# Patient Record
Sex: MALE | Employment: FULL TIME | URBAN - METROPOLITAN AREA
[De-identification: names, ages, dates, MRNs, and addresses within clinical notes are randomized per-mention and may not be internally consistent; named-entity substitution may affect disease eponyms.]

---

## 2018-11-02 ENCOUNTER — HOSPITAL ENCOUNTER (INPATIENT)
Age: 63
LOS: 1 days | Discharge: HOME | DRG: 916 | End: 2018-11-03
Attending: EMERGENCY MEDICINE | Admitting: FAMILY MEDICINE
Payer: COMMERCIAL

## 2018-11-02 DIAGNOSIS — R55 SYNCOPE, UNSPECIFIED SYNCOPE TYPE: Primary | ICD-10-CM

## 2018-11-02 DIAGNOSIS — T78.40XA ALLERGIC REACTION, INITIAL ENCOUNTER: ICD-10-CM

## 2018-11-02 PROBLEM — T78.2XXA ANAPHYLAXIS: Status: ACTIVE | Noted: 2018-11-02

## 2018-11-02 PROBLEM — R00.1 BRADYCARDIA: Status: ACTIVE | Noted: 2018-11-02

## 2018-11-02 PROBLEM — E87.20 LACTIC ACID ACIDOSIS: Status: ACTIVE | Noted: 2018-11-02

## 2018-11-02 LAB
ALBUMIN SERPL-MCNC: 3.5 G/DL (ref 3.5–5)
ALBUMIN/GLOB SERPL: 1.1 {RATIO} (ref 1.1–2.2)
ALP SERPL-CCNC: 82 U/L (ref 45–117)
ALT SERPL-CCNC: 30 U/L (ref 12–78)
ANION GAP SERPL CALC-SCNC: 12 MMOL/L (ref 5–15)
ANION GAP SERPL CALC-SCNC: 7 MMOL/L (ref 5–15)
AST SERPL-CCNC: 29 U/L (ref 15–37)
ATRIAL RATE: 68 BPM
BASOPHILS # BLD: 0 K/UL (ref 0–0.1)
BASOPHILS NFR BLD: 0 % (ref 0–1)
BILIRUB SERPL-MCNC: 0.5 MG/DL (ref 0.2–1)
BUN SERPL-MCNC: 14 MG/DL (ref 6–20)
BUN SERPL-MCNC: 16 MG/DL (ref 6–20)
BUN/CREAT SERPL: 12 (ref 12–20)
BUN/CREAT SERPL: 14 (ref 12–20)
CALCIUM SERPL-MCNC: 8.4 MG/DL (ref 8.5–10.1)
CALCIUM SERPL-MCNC: 8.8 MG/DL (ref 8.5–10.1)
CALCULATED P AXIS, ECG09: 31 DEGREES
CALCULATED R AXIS, ECG10: 54 DEGREES
CALCULATED T AXIS, ECG11: 40 DEGREES
CHLORIDE SERPL-SCNC: 105 MMOL/L (ref 97–108)
CHLORIDE SERPL-SCNC: 106 MMOL/L (ref 97–108)
CO2 SERPL-SCNC: 22 MMOL/L (ref 21–32)
CO2 SERPL-SCNC: 26 MMOL/L (ref 21–32)
COMMENT, HOLDF: NORMAL
CREAT SERPL-MCNC: 1.15 MG/DL (ref 0.7–1.3)
CREAT SERPL-MCNC: 1.2 MG/DL (ref 0.7–1.3)
DIAGNOSIS, 93000: NORMAL
DIFFERENTIAL METHOD BLD: ABNORMAL
EOSINOPHIL # BLD: 0.1 K/UL (ref 0–0.4)
EOSINOPHIL NFR BLD: 0 % (ref 0–7)
ERYTHROCYTE [DISTWIDTH] IN BLOOD BY AUTOMATED COUNT: 13.1 % (ref 11.5–14.5)
GLOBULIN SER CALC-MCNC: 3.3 G/DL (ref 2–4)
GLUCOSE SERPL-MCNC: 123 MG/DL (ref 65–100)
GLUCOSE SERPL-MCNC: 158 MG/DL (ref 65–100)
HCT VFR BLD AUTO: 46.2 % (ref 36.6–50.3)
HGB BLD-MCNC: 15.9 G/DL (ref 12.1–17)
LACTATE BLD-SCNC: 2.01 MMOL/L (ref 0.4–2)
LACTATE SERPL-SCNC: 2.7 MMOL/L (ref 0.4–2)
LACTATE SERPL-SCNC: 3.5 MMOL/L (ref 0.4–2)
LACTATE SERPL-SCNC: 3.7 MMOL/L (ref 0.4–2)
LYMPHOCYTES # BLD: 1.6 K/UL
LYMPHOCYTES NFR BLD: 10 % (ref 12–49)
MCH RBC QN AUTO: 30.7 PG (ref 26–34)
MCHC RBC AUTO-ENTMCNC: 34.4 G/DL (ref 30–36.5)
MCV RBC AUTO: 89.2 FL (ref 80–99)
MONOCYTES # BLD: 0.5 K/UL (ref 0–1)
MONOCYTES NFR BLD: 3 % (ref 5–13)
NEUTS SEG # BLD: 12.9 K/UL (ref 1.8–8)
NEUTS SEG NFR BLD: 87 % (ref 32–75)
P-R INTERVAL, ECG05: 160 MS
PLATELET # BLD AUTO: 168 K/UL (ref 150–400)
PMV BLD AUTO: 11 FL (ref 8.9–12.9)
POTASSIUM SERPL-SCNC: 3.9 MMOL/L (ref 3.5–5.1)
POTASSIUM SERPL-SCNC: 4.2 MMOL/L (ref 3.5–5.1)
PROT SERPL-MCNC: 6.8 G/DL (ref 6.4–8.2)
Q-T INTERVAL, ECG07: 396 MS
QRS DURATION, ECG06: 98 MS
QTC CALCULATION (BEZET), ECG08: 421 MS
RBC # BLD AUTO: 5.18 M/UL (ref 4.1–5.7)
SAMPLES BEING HELD,HOLD: NORMAL
SODIUM SERPL-SCNC: 139 MMOL/L (ref 136–145)
SODIUM SERPL-SCNC: 139 MMOL/L (ref 136–145)
TROPONIN I SERPL-MCNC: <0.05 NG/ML
VENTRICULAR RATE, ECG03: 68 BPM
WBC # BLD AUTO: 15 K/UL (ref 4.1–11.1)
XXWBCSUS: 0

## 2018-11-02 PROCEDURE — 74011636637 HC RX REV CODE- 636/637: Performed by: EMERGENCY MEDICINE

## 2018-11-02 PROCEDURE — 85025 COMPLETE CBC W/AUTO DIFF WBC: CPT | Performed by: EMERGENCY MEDICINE

## 2018-11-02 PROCEDURE — 83605 ASSAY OF LACTIC ACID: CPT | Performed by: FAMILY MEDICINE

## 2018-11-02 PROCEDURE — 83605 ASSAY OF LACTIC ACID: CPT

## 2018-11-02 PROCEDURE — 87040 BLOOD CULTURE FOR BACTERIA: CPT | Performed by: EMERGENCY MEDICINE

## 2018-11-02 PROCEDURE — 74011250636 HC RX REV CODE- 250/636: Performed by: FAMILY MEDICINE

## 2018-11-02 PROCEDURE — 74011250636 HC RX REV CODE- 250/636

## 2018-11-02 PROCEDURE — 36415 COLL VENOUS BLD VENIPUNCTURE: CPT | Performed by: FAMILY MEDICINE

## 2018-11-02 PROCEDURE — 80053 COMPREHEN METABOLIC PANEL: CPT | Performed by: EMERGENCY MEDICINE

## 2018-11-02 PROCEDURE — 84484 ASSAY OF TROPONIN QUANT: CPT | Performed by: EMERGENCY MEDICINE

## 2018-11-02 PROCEDURE — 74011250636 HC RX REV CODE- 250/636: Performed by: EMERGENCY MEDICINE

## 2018-11-02 PROCEDURE — 65660000000 HC RM CCU STEPDOWN

## 2018-11-02 PROCEDURE — 96374 THER/PROPH/DIAG INJ IV PUSH: CPT

## 2018-11-02 PROCEDURE — 96375 TX/PRO/DX INJ NEW DRUG ADDON: CPT

## 2018-11-02 PROCEDURE — 99285 EMERGENCY DEPT VISIT HI MDM: CPT

## 2018-11-02 PROCEDURE — 74011250636 HC RX REV CODE- 250/636: Performed by: STUDENT IN AN ORGANIZED HEALTH CARE EDUCATION/TRAINING PROGRAM

## 2018-11-02 PROCEDURE — 93005 ELECTROCARDIOGRAM TRACING: CPT

## 2018-11-02 RX ORDER — PREDNISONE 20 MG/1
TABLET ORAL
Qty: 10 TAB | Refills: 0 | Status: SHIPPED | OUTPATIENT
Start: 2018-11-03 | End: 2018-11-03 | Stop reason: SDUPTHER

## 2018-11-02 RX ORDER — PREDNISONE 20 MG/1
60 TABLET ORAL
Status: COMPLETED | OUTPATIENT
Start: 2018-11-02 | End: 2018-11-02

## 2018-11-02 RX ORDER — SODIUM CHLORIDE 0.9 % (FLUSH) 0.9 %
5-10 SYRINGE (ML) INJECTION EVERY 8 HOURS
Status: DISCONTINUED | OUTPATIENT
Start: 2018-11-02 | End: 2018-11-03 | Stop reason: HOSPADM

## 2018-11-02 RX ORDER — SODIUM CHLORIDE 0.9 % (FLUSH) 0.9 %
5-10 SYRINGE (ML) INJECTION AS NEEDED
Status: DISCONTINUED | OUTPATIENT
Start: 2018-11-02 | End: 2018-11-03 | Stop reason: HOSPADM

## 2018-11-02 RX ORDER — SODIUM CHLORIDE 9 MG/ML
125 INJECTION, SOLUTION INTRAVENOUS CONTINUOUS
Status: DISCONTINUED | OUTPATIENT
Start: 2018-11-02 | End: 2018-11-03 | Stop reason: HOSPADM

## 2018-11-02 RX ORDER — HEPARIN SODIUM 5000 [USP'U]/ML
5000 INJECTION, SOLUTION INTRAVENOUS; SUBCUTANEOUS EVERY 8 HOURS
Status: DISCONTINUED | OUTPATIENT
Start: 2018-11-02 | End: 2018-11-03 | Stop reason: HOSPADM

## 2018-11-02 RX ORDER — SODIUM CHLORIDE 9 MG/ML
500 INJECTION, SOLUTION INTRAVENOUS ONCE
Status: COMPLETED | OUTPATIENT
Start: 2018-11-03 | End: 2018-11-03

## 2018-11-02 RX ORDER — DIPHENHYDRAMINE HYDROCHLORIDE 50 MG/ML
25 INJECTION, SOLUTION INTRAMUSCULAR; INTRAVENOUS ONCE
Status: COMPLETED | OUTPATIENT
Start: 2018-11-02 | End: 2018-11-02

## 2018-11-02 RX ORDER — FAMOTIDINE 10 MG/ML
20 INJECTION INTRAVENOUS
Status: COMPLETED | OUTPATIENT
Start: 2018-11-02 | End: 2018-11-02

## 2018-11-02 RX ORDER — ATROPINE SULFATE 0.1 MG/ML
0.5 INJECTION INTRAVENOUS ONCE
Status: COMPLETED | OUTPATIENT
Start: 2018-11-02 | End: 2018-11-02

## 2018-11-02 RX ORDER — ASPIRIN 500 MG
1000 TABLET, DELAYED RELEASE (ENTERIC COATED) ORAL
COMMUNITY
End: 2019-07-15

## 2018-11-02 RX ADMIN — ATROPINE SULFATE 0.5 MG: 0.1 INJECTION, SOLUTION ENDOTRACHEAL; INTRAMUSCULAR; INTRAVENOUS; SUBCUTANEOUS at 05:56

## 2018-11-02 RX ADMIN — FAMOTIDINE 20 MG: 10 INJECTION, SOLUTION INTRAVENOUS at 05:34

## 2018-11-02 RX ADMIN — SODIUM CHLORIDE 1000 ML: 900 INJECTION, SOLUTION INTRAVENOUS at 13:44

## 2018-11-02 RX ADMIN — HEPARIN SODIUM 5000 UNITS: 5000 INJECTION, SOLUTION INTRAVENOUS; SUBCUTANEOUS at 22:42

## 2018-11-02 RX ADMIN — DIPHENHYDRAMINE HYDROCHLORIDE 25 MG: 50 INJECTION, SOLUTION INTRAMUSCULAR; INTRAVENOUS at 05:31

## 2018-11-02 RX ADMIN — SODIUM CHLORIDE 1000 ML: 900 INJECTION, SOLUTION INTRAVENOUS at 06:00

## 2018-11-02 RX ADMIN — SODIUM CHLORIDE 1000 ML: 900 INJECTION, SOLUTION INTRAVENOUS at 20:57

## 2018-11-02 RX ADMIN — SODIUM CHLORIDE 125 ML/HR: 900 INJECTION, SOLUTION INTRAVENOUS at 08:48

## 2018-11-02 RX ADMIN — PREDNISONE 60 MG: 20 TABLET ORAL at 05:29

## 2018-11-02 RX ADMIN — METHYLPREDNISOLONE SODIUM SUCCINATE 125 MG: 125 INJECTION, POWDER, FOR SOLUTION INTRAMUSCULAR; INTRAVENOUS at 07:27

## 2018-11-02 RX ADMIN — Medication 5 ML: at 14:00

## 2018-11-02 RX ADMIN — SODIUM CHLORIDE 125 ML/HR: 900 INJECTION, SOLUTION INTRAVENOUS at 19:00

## 2018-11-02 NOTE — ED NOTES
Dr Pako Mendoza at bedside aware of heart rate in 40s and BP 81/37. NS bolus started and Atropine given per orders.

## 2018-11-02 NOTE — H&P
[address] [place] [address]   Office [phone]  Fax [phone]       Admission H&P     Name: Dina Smith MRN: [id number]  Sex: Male   YOB: 1955  Age: 61 y.o. PCP: Carlos Catherine MD     Source of Information: patient, medical records    Chief complaint: allergic reaction and passing out    History of Present Illness  Dina Smith is a 61 y.o. male with hx of 1 prior allergic reaction in the past who presented to the [facility] ER by EMS for allergic reaction, syncope, and urticaria. The patient reports that yesterday evening he ate a hot dog around 17:30. Later in the evening around 11:00, he started to develop itching that got progressively worse. He reports that at 12:30 am he took 2 benadryl and 2 aspirin, but this did not improve his symtpoms. He then having an unwitnessed episode of syncope as he walking to the bathroom. He reports calling out to his wife prior to passing out and feeling faint, but does not remember any thing else until being woken up by his wife. He wife reports that he heard him call out and fall, and she immediatly responded. She found him slumped over with his back and side toward the wall. She does not believe that he hit his head on the fall because he was sitting against the wall when she found him. EMS was called and his BP was too low to be read according to his wife. He was given 500 cc NS bolus, and brought to the [facility] ED. At the [facility] ED, his BP was found to be 15/39 systolic. Labs were obtained, which showed: CBC wnl, BMP s/f Cr of 1.15 (b/l  0.75). Troponin was negative. EKG showed NSR with occasional PVCs. He was given 1L NS bolus. He was also given solumedrol 125mg x1, atropine 0.5mg x1, pepcid 20mg x1, benadryl 25mg once, and Prednisone 60mg. He was started on 125ml NS, and he was reffered for admission for further management of his allergic reaction and hypotension.      On evaluation the patient reports that he is feeling better. He denies any dyspnea, CP, n/v, f/c, dizziness, vision changes, or other concerning symptoms. He reports that his pruritis is improving. He denies any tightness of the throat or mouth. He denies any swelling sensation of the tongue, lips, or throat. He denies any chest tightness or difficulty breathing. The patient reports that has a history of suspected meat allergy 2 years ago for which he went to the ED. At that time, he was prescribed an EPiPen, but it was never filled. He denies having followed up with an allergist of having any specific allergy testing. He reports that he has eaten hot dogs since the last episode without any symptoms. Past Medical History:   Diagnosis Date    Contact dermatitis and other eczema, due to unspecified cause       Patient Vitals for the past 12 hrs:   Temp Pulse Resp BP SpO2   11/02/18 1000 -- 92 17 121/75 93 %   11/02/18 0945 -- 87 19 120/74 92 %   11/02/18 0930 98.6 °F (37 °C) 98 27 108/84 94 %   11/02/18 0715 98.4 °F (36.9 °C) 79 16 140/77 95 %   11/02/18 0700 -- 82 15 124/65 96 %   11/02/18 0630 -- 79 13 132/67 98 %   11/02/18 0626 97.6 °F (36.4 °C) 81 16 121/69 100 %   11/02/18 0605 -- 69 13 103/65 96 %   11/02/18 0556 -- (!) 53 -- (!) 86/62 --   11/02/18 0548 -- (!) 56 17 (!) 86/62 95 %   11/02/18 0545 -- (!) 56 20 (!) 81/37 97 %   11/02/18 0530 -- 74 18 112/77 97 %   11/02/18 0521 -- 72 16 107/64 96 %   11/02/18 0500 97.7 °F (36.5 °C) 66 18 120/73 100 %       Home Medications   Prior to Admission medications    Medication Sig Start Date End Date Taking? Authorizing Provider   aspirin  mg tablet Take 1,000 mg by mouth every six (6) hours as needed for Pain. Yes Other, MD Ammon   diphenhydrAMINE (BENADRYL) 25 mg capsule Take 50 mg by mouth every six (6) hours as needed. Yes Other, MD Ammon   EPINEPHrine (EPIPEN) 0.3 mg/0.3 mL injection 0.3 mL by IntraMUSCular route once as needed for up to 1 dose.  9/3/16   Perri Lomeli MD Allergies  Allergies   Allergen Reactions    Beef Containing Products Hives     Grass feed beef or hops       Past Medical History:   Diagnosis Date    Contact dermatitis and other eczema, due to unspecified cause          History reviewed. No pertinent surgical history. Family History   Problem Relation Age of Onset    Hypertension Mother     Stroke Mother     Psychiatric Disorder Father         dementia    Hypertension Sister        Social History  Social History     Socioeconomic History    Marital status:      Spouse name: Not on file    Number of children: Not on file    Years of education: Not on file    Highest education level: Not on file   Social Needs    Financial resource strain: Not on file    Food insecurity - worry: Not on file    Food insecurity - inability: Not on file   [employer] needs - medical: Not on file   [employer] needs - non-medical: Not on file   Occupational History    Not on file   Tobacco Use    Smoking status: Never Smoker    Smokeless tobacco: Never Used   Substance and Sexual Activity    Alcohol use: Yes     Comment: occasional    Drug use: No    Sexual activity: Yes     Partners: Female   Other Topics Concern    Not on file   Social History Narrative    Not on file       Alcohol history: Rarely  Smoking history: Non-smoker  Illicit drug history: Not at all    Living arrangement: patient lives with his spouse. Ambulates: Independently     Review of Systems  Review of Systems   Constitutional: Negative for appetite change. Negative for activity change, chills, diaphoresis and fatigue. HENT: Negative for congestion and dental problem. Eyes: Negative for discharge. Respiratory: Negative for apnea and chest tightness. Cardiovascular: Negative for chest pain and leg swelling. Gastrointestinal: Negative for abdominal distention and abdominal pain. Genitourinary: Negative for difficulty urinating and dysuria.    Musculoskeletal: Negative for arthralgias and back pain. Skin: see HPI  Neurological: see HPI  Hematological: Negative for adenopathy. Psychiatric/Behavioral: Negative for agitation. The patient is not nervous/anxious. Physical Exam  Physical Exam   Constitutional: well-developed and well-nourished. HENT:   Head: Normocephalic and atraumatic. Eyes: Conjunctivae are normal. Pupils are equal, round, and reactive to light. Throat: no swelling of tongue, lips, or posterior oropharynx  Neck: Normal range of motion. Neck supple. No JVD present. No tracheal deviation present. No thyromegaly present. Cardiovascular: Normal rate, regular rhythm and normal heart sounds. Exam reveals no friction rub. No murmur heard. Pulmonary/Chest: Effort normal and breath sounds normal. No stridor. No respiratory distress. no wheezes. no rales. no tenderness. Abdominal: Soft. Bowel sounds are normal. no distension and no mass. no tenderness. There is no rebound and no guarding. Musculoskeletal: Normal range of motion. no edema, tenderness or deformity. Lymphadenopathy:    no cervical adenopathy. Neurological: No cranial nerve deficit. Coordination normal.   Skin: scattered areas of slightly raised, erythematous rashes along the inferior trunk and back  Psychiatric: normal mood and affect. O2 Device: Room air     Laboratory Data  Recent Results (from the past 8 hour(s))   CBC WITH AUTOMATED DIFF    Collection Time: 11/02/18  5:10 AM   Result Value Ref Range    WBC 15.0 (H) 4.1 - 11.1 K/uL    RBC 5.18 4.10 - 5.70 M/uL    HGB 15.9 12.1 - 17.0 g/dL    HCT 46.2 36.6 - 50.3 %    MCV 89.2 80.0 - 99.0 FL    MCH 30.7 26.0 - 34.0 PG    MCHC 34.4 30.0 - 36.5 g/dL    RDW 13.1 11.5 - 14.5 %    PLATELET 475 559 - 331 K/uL    MPV 11.0 8.9 - 12.9 FL    NEUTROPHILS 87 (H) 32 - 75 %    LYMPHOCYTES 10 (L) 12 - 49 %    MONOCYTES 3 (L) 5 - 13 %    EOSINOPHILS 0 0 - 7 %    BASOPHILS 0 0 - 1 %    ABS.  NEUTROPHILS 12.9 (H) 1.8 - 8.0 K/UL    ABS. LYMPHOCYTES 1.6 K/UL    ABS. MONOCYTES 0.5 0.0 - 1.0 K/UL    ABS. EOSINOPHILS 0.1 0.0 - 0.4 K/UL    ABS. BASOPHILS 0.0 0.0 - 0.1 K/UL    DF AUTOMATED      XXWBCSUS 0     METABOLIC PANEL, COMPREHENSIVE    Collection Time: 11/02/18  5:10 AM   Result Value Ref Range    Sodium 139 136 - 145 mmol/L    Potassium 4.2 3.5 - 5.1 mmol/L    Chloride 106 97 - 108 mmol/L    CO2 26 21 - 32 mmol/L    Anion gap 7 5 - 15 mmol/L    Glucose 123 (H) 65 - 100 mg/dL    BUN 16 6 - 20 MG/DL    Creatinine 1.15 0.70 - 1.30 MG/DL    BUN/Creatinine ratio 14 12 - 20      GFR est AA >60 >60 ml/min/1.73m2    GFR est non-AA >60 >60 ml/min/1.73m2    Calcium 8.8 8.5 - 10.1 MG/DL    Bilirubin, total 0.5 0.2 - 1.0 MG/DL    ALT (SGPT) 30 12 - 78 U/L    AST (SGOT) 29 15 - 37 U/L    Alk. phosphatase 82 45 - 117 U/L    Protein, total 6.8 6.4 - 8.2 g/dL    Albumin 3.5 3.5 - 5.0 g/dL    Globulin 3.3 2.0 - 4.0 g/dL    A-G Ratio 1.1 1.1 - 2.2     TROPONIN I    Collection Time: 11/02/18  5:10 AM   Result Value Ref Range    Troponin-I, Qt. <0.05 <0.05 ng/mL   SAMPLES BEING HELD    Collection Time: 11/02/18  5:10 AM   Result Value Ref Range    SAMPLES BEING HELD 1BLUE     COMMENT        Add-on orders for these samples will be processed based on acceptable specimen integrity and analyte stability, which may vary by analyte.    EKG, 12 LEAD, INITIAL    Collection Time: 11/02/18  5:20 AM   Result Value Ref Range    Ventricular Rate 76 BPM    Atrial Rate 76 BPM    P-R Interval 174 ms    QRS Duration 98 ms    Q-T Interval 406 ms    QTC Calculation (Bezet) 456 ms    Calculated P Axis 50 degrees    Calculated R Axis 52 degrees    Calculated T Axis 34 degrees    Diagnosis       Sinus rhythm with occasional and consecutive premature ventricular complexes  Abnormal ECG  No previous ECGs available     POC LACTIC ACID    Collection Time: 11/02/18  6:07 AM   Result Value Ref Range    Lactic Acid (POC) 2.01 (HH) 0.40 - 2.00 mmol/L         EKG: normal sinus rhythm, occasional PVC noted      Assessment and Plan     Ean Lopez is a 61 y.o. male who is admitted for allergic reaction and syncope 2/2 suspected beef allergy    Allergic Reaction: Urticaria, hypotension, and syncope 2/2 suspected beef allergy related to intake of hot dog on evening of 11/1/18. Patient reports having only one single episode of  allergic reaction in the past that included urticaria and throat swelling in the past 2 years ago. At that time he was given prescription for epinephrine injector, but it was not filled. He denies being evaluated by allergist in past. EMS provided 500cc NS bolus. In the ED, the patient has received  Solumedrol 125mg x 1, 1000cc NS bolus, atropine 0.5mg x 1, pepcid 20mg x 1, benadryl 25mg once, prednisone 60mg x 1. Presently the patient is asymptomatic and denies any breathing difficulties or itching.   -continue maintenance IVF  -Continue to monitor symptoms, breathing status, and vital signs.   -consider epinephrine, airway support, and other resuscitative measures as necessary  -recommend allergy work by PCP as outpatient  -recommend that patient avoid meats until he has allergy workup  -upon discharge will provide 5 day steroid burst  -ensure that patient has Epipen and instructions for use on discharge    Lactic Acidosis: LA was 2.01 in Ed  - continue IVF  -will recheck at 12:00 pm today    At Risk MARIANO: baseline Cr is 0.75, patient has cr today of 1.15  -continue IVF  -recheck BMP at 12:00 pm today          FEN/GI - Vegan Diet (d/t meat allergy). NS at 125 mL/hr. Activity - Out of bed with assistance  DVT prophylaxis - Sub Q Heparin  GI prophylaxis - Not indicated at this time  Fall prophylaxis - Not indicated at this time. Disposition - Admit to Stepdown. Plan to d/c to Home. Code Status - Full, discussed with patient / caregivers. Patient Ean Lopez will be discussed Dr. Ale Cotton.     10:39 AM, 11/02/18  Yessenia Donaldson MD  Family Medicine Resident       For Billing    Chief Complaint   Patient presents with   Sharon Heaton Allergic Reaction       Hospital Problems  Date Reviewed: 11/2/2018          Codes Class Noted POA    Bradycardia ICD-10-CM: R00.1  ICD-9-CM: 427.89  11/2/2018 Unknown        Syncope ICD-10-CM: R55  ICD-9-CM: 780.2  11/2/2018 Unknown        * (Principal) Allergic reaction ICD-10-CM: T78.40XA  ICD-9-CM: 995.3  11/2/2018 Unknown

## 2018-11-02 NOTE — ED PROVIDER NOTES
Nanda Abdullahi is a 60 yo M with history of allergy to beef who developed hives after eating a hotdog. He ate the hotdog around 9 pm and then a couple of hours later started to develop hives. He took 50 mg of benadryl around midnight and then another 25 mg an hour later. Then around 3:45am, he got up to go to the bathroom and passed out. His wife called [phone]. When EMS arrived. He was awake but had a HR in 40s and SBP in the 80s. EMS gave 500ml IVNS bolus and BP and HR improved. Patient denies cough, shortness or breath or throat irritation. Wife at bedside and states that shortly before 4am she heard her  call out to her. She found him in the hernandez sitting, leaning up against the wall. He was awake but slow to respond and was very sweaty. Past Medical History:   Diagnosis Date    Contact dermatitis and other eczema, due to unspecified cause        History reviewed. No pertinent surgical history.       Family History:   Problem Relation Age of Onset   [address] Hypertension Mother     Stroke Mother     Psychiatric Disorder Father         dementia    Hypertension Sister        Social History     Socioeconomic History    Marital status:      Spouse name: Not on file    Number of children: Not on file    Years of education: Not on file    Highest education level: Not on file   Social Needs    Financial resource strain: Not on file    Food insecurity - worry: Not on file    Food insecurity - inability: Not on file   [employer] needs - medical: Not on file   [employer] needs - non-medical: Not on file   Occupational History    Not on file   Tobacco Use    Smoking status: Never Smoker    Smokeless tobacco: Never Used   Substance and Sexual Activity    Alcohol use: Yes     Comment: occasional    Drug use: No    Sexual activity: Yes     Partners: Female   Other Topics Concern    Not on file   Social History Narrative    Not on file         ALLERGIES: Beef containing products    Review of Systems   Constitutional: Negative for fever. HENT: Negative for sore throat. Eyes: Negative for visual disturbance. Respiratory: Negative for cough and shortness of breath. Cardiovascular: Negative for chest pain. Gastrointestinal: Negative for abdominal pain. Genitourinary: Negative for dysuria. Musculoskeletal: Negative for back pain. Skin: Positive for rash. Neurological: Positive for syncope. Negative for headaches. Vitals:    11/02/18 0500   BP: 120/73   Pulse: 66   Resp: 18   Temp: 97.7 °F (36.5 °C)   SpO2: 100%   Weight: 106.6 kg (235 lb)   Height: 6' 3\" (1.905 m)            Physical Exam   Constitutional: He appears well-developed and well-nourished. No distress. HENT:   Head: Normocephalic and atraumatic. Mouth/Throat: Oropharynx is clear and moist. No posterior oropharyngeal edema. Eyes: Conjunctivae and EOM are normal.   Neck: Normal range of motion and phonation normal.   Cardiovascular: Normal rate and intact distal pulses. Pulmonary/Chest: Effort normal. No stridor. No respiratory distress. He has no wheezes. Abdominal: He exhibits no distension. Musculoskeletal: Normal range of motion. He exhibits no tenderness. Neurological: He is alert. He is not disoriented. He exhibits normal muscle tone. Skin: Skin is warm and dry. Rash noted. Rash is urticarial (trunk, arms and legs). Nursing note and vitals reviewed. ED EKG interpretation:  Rhythm: normal sinus rhythm; and regular . Rate (approx.): 68; Axis: normal; P wave: normal; QRS interval: normal ; ST/T wave: normal; Other findings: normal. This EKG was interpreted by Chula Drake MD,ED Provider. [facility]   Patient with syncopal episode after developing urticaria after eating hotdog. Known beef allergy. HR, BP and mentation normal in ED. Last benadryl at 1am.  Will give benadryl, famotidine and prednisone.     5:56 AM  Patient now feeling hot and asking to go to bathroom for BM. Patient diaphoretic, bradycardic. Suspect vasovagal reaction but bradycardia persists. 0.5mg atropine ordered. 6:26 AM  Discussed with family medicine, Dr. Granado Leader, patient will allergic reaction to ingesting meat, bradycardia and syncope. HR improved after atropine. Will admit.      Total critical care time spent exclusive of procedures:  35minutes    Procedures

## 2018-11-02 NOTE — ED NOTES
IV fluids infusing as ordered. Patient tolerating well. Timeout performed with [facility]. Crew verbalizes understanding of patient condition and destination of [facility] ICU 2. Patient in no distress, vital signs stable at time of transport. Transfer Assessment: Patient A&O x4 and in no distress. Physical re-examination reveals marked improvement in pts condition with reassessment of vital signs completed at the time of admission transfer and/or discharge.

## 2018-11-02 NOTE — PROGRESS NOTES
Reason for Admission:   Syncopal episode with bradycardia after developing urticaria after eating hotdog with known beef allergy                   RRAT Score:  0                   Plan for utilizing home health: There are no identified home health or hospital to home needs @ this time. Likelihood of Readmission: low/green                          Transition of Care Plan:                    I met with pt who confirmed he lives with his wife @ the address on demographics. Pt states he had a similar episode  after eating beef with his daughter. He was then diagnosed with a beef allergy He states he ate a \"Ronak \" hotdog in June but did not have any problems after eating that hotdog so did not anticipate problems with this hotdog. There are no identified home health needs or hospital to home needs. Pt states he was advised to go to an allergist after the first allergic reaction but he has never followed through. Pt states he never followed through with getting an epi-pen either. Attending states he nita prescribe an epi-pen when discharged. I notified Dr Héctor Martin & her nurse navigator that pt is hospitalized @ [facility],  From a case management perspective,pt has no identified discharge needs . Wife will transport pt home when discharged.     Escobar Berry

## 2018-11-02 NOTE — PROGRESS NOTES
@6752 TRANSFER - IN REPORT:    Verbal report received from [place] [facility] [place] [facility]\"") on Vinod Pear  being received from [facility] ED(unit) for routine progression of care      Report consisted of patients Situation, Background, Assessment and   Recommendations(SBAR). Information from the following report(s) SBAR, Kardex, ED Summary, Intake/Output, MAR, Accordion, Recent Results, Med Rec Status, Cardiac Rhythm Sinus and Alarm Parameters  was reviewed with the receiving nurse. Opportunity for questions and clarification was provided. Assessment completed upon patients arrival to unit and care assumed. @7524 Patient arrives to ICU 2 no signs of acute distress. Doctor Zuleyma Castro at bedside already. @1000 Dr. Josh Beard and team arrive to exam and admit patient. Plan of care is to obtain BMP and Lactic acid at noon. @1150 BMP and Lactic Acid Obtained. @5402 Family Practice updated to lactic acid results and will address.

## 2018-11-02 NOTE — ED NOTES
Patient medicated with ordered solumedrol IV; patient tolerated well. Spouse remains at bedside. Vital signs stable. Patient speaking full sentences and is in no respiratory distress. Call bell in reach.

## 2018-11-02 NOTE — ED TRIAGE NOTES
I ate a beef hotdog last night 1700 and have been having an itchy rash all over in spite of taking Benadryl  0340 passed out on the way to the bathroom  EMS found on floor with decreased level of consciousness and HR in the 40's and SBP in 80's  Alert and feeling better on arrival. Feeling less itchy but still having red splotchy rash

## 2018-11-02 NOTE — PROGRESS NOTES
1910 Bedside and Verbal shift change report received from Agnes Jeffries RN (offgoing nurse) by Wali Rowe RN (oncoming nurse). Report included the following information SBAR, Kardex, ED Summary, Procedure Summary, Intake/Output, MAR, Accordion, Recent Results, Cardiac Rhythm ST and Alarm Parameters . [address] resident notified of lactic result, order received. 0400 Am labs drawn and sent  0700 Bedside and Verbal shift change report given to Davy Catherine RN (oncoming nurse) by DEMARCUS Martinez RN (offgoing nurse). Report included the following information SBAR, Kardex, ED Summary, Intake/Output, MAR, Accordion, Recent Results, Cardiac Rhythm SR and Alarm Parameters .

## 2018-11-02 NOTE — ED NOTES
Assumed care at shift change while pt awaiting transport. Pt reexamined now and having no gi sx. Resting comfortably. No stridor or sob.  Mild hives to lower abd and hips unchanged since 7 am

## 2018-11-02 NOTE — ED NOTES
Pt responding well to medication administration. Alert and oriented. Skin warm and dry and now appropriate color for ethnicity. Reporting to be cold and is shaking. Vitals reassessed, temperature 97.7, provided with additional warm blankets.

## 2018-11-02 NOTE — ED NOTES
Assumed care of patient. Patient resting on stretcher in no distress; urticaria persists. Providers at bedside.

## 2018-11-02 NOTE — ED NOTES
TRANSFER - OUT REPORT:    Verbal report given to ROJAS Zaman(name) on Aleks Monzon  being transferred to [facility] ICU(unit) for routine progression of care       Report consisted of patients Situation, Background, Assessment and   Recommendations(SBAR). Information from the following report(s) SBAR, ED Summary, Intake/Output, MAR, Recent Results, Med Rec Status and Cardiac Rhythm NSR was reviewed with the receiving nurse. Lines:   Peripheral IV 11/02/18 Left Hand (Active)       Peripheral IV 11/02/18 Anterior Antecubital (Active)   Site Assessment Clean, dry, & intact 11/2/2018  6:09 AM   Phlebitis Assessment 0 11/2/2018  6:09 AM   Infiltration Assessment 0 11/2/2018  6:09 AM   Dressing Status Clean, dry, & intact 11/2/2018  6:09 AM   Dressing Type Transparent 11/2/2018  6:09 AM   Hub Color/Line Status Pink 11/2/2018  6:09 AM   Alcohol Cap Used Yes 11/2/2018  6:09 AM        Opportunity for questions and clarification was provided.       Patient transported with:   Monitor    Saline Lock

## 2018-11-03 VITALS
SYSTOLIC BLOOD PRESSURE: 115 MMHG | RESPIRATION RATE: 17 BRPM | WEIGHT: 251.1 LBS | HEART RATE: 73 BPM | DIASTOLIC BLOOD PRESSURE: 70 MMHG | TEMPERATURE: 98.1 F | BODY MASS INDEX: 31.22 KG/M2 | OXYGEN SATURATION: 98 % | HEIGHT: 75 IN

## 2018-11-03 LAB
ANION GAP SERPL CALC-SCNC: 7 MMOL/L (ref 5–15)
BACTERIA SPEC CULT: NORMAL
BACTERIA SPEC CULT: NORMAL
BASOPHILS # BLD: 0 K/UL (ref 0–0.1)
BASOPHILS NFR BLD: 0 % (ref 0–1)
BUN SERPL-MCNC: 11 MG/DL (ref 6–20)
BUN/CREAT SERPL: 12 (ref 12–20)
CALCIUM SERPL-MCNC: 7.9 MG/DL (ref 8.5–10.1)
CHLORIDE SERPL-SCNC: 109 MMOL/L (ref 97–108)
CO2 SERPL-SCNC: 25 MMOL/L (ref 21–32)
CREAT SERPL-MCNC: 0.91 MG/DL (ref 0.7–1.3)
DIFFERENTIAL METHOD BLD: ABNORMAL
EOSINOPHIL # BLD: 0 K/UL (ref 0–0.4)
EOSINOPHIL NFR BLD: 0 % (ref 0–7)
ERYTHROCYTE [DISTWIDTH] IN BLOOD BY AUTOMATED COUNT: 12.6 % (ref 11.5–14.5)
GLUCOSE SERPL-MCNC: 127 MG/DL (ref 65–100)
HCT VFR BLD AUTO: 36.1 % (ref 36.6–50.3)
HGB BLD-MCNC: 12.1 G/DL (ref 12.1–17)
IMM GRANULOCYTES # BLD: 0 K/UL (ref 0–0.04)
IMM GRANULOCYTES NFR BLD AUTO: 0 % (ref 0–0.5)
LACTATE SERPL-SCNC: 1.7 MMOL/L (ref 0.4–2)
LYMPHOCYTES # BLD: 1 K/UL (ref 0.8–3.5)
LYMPHOCYTES NFR BLD: 8 % (ref 12–49)
MAGNESIUM SERPL-MCNC: 2 MG/DL (ref 1.6–2.4)
MCH RBC QN AUTO: 30.3 PG (ref 26–34)
MCHC RBC AUTO-ENTMCNC: 33.5 G/DL (ref 30–36.5)
MCV RBC AUTO: 90.5 FL (ref 80–99)
MONOCYTES # BLD: 0.5 K/UL (ref 0–1)
MONOCYTES NFR BLD: 4 % (ref 5–13)
NEUTS SEG # BLD: 10.9 K/UL (ref 1.8–8)
NEUTS SEG NFR BLD: 88 % (ref 32–75)
NRBC # BLD: 0 K/UL (ref 0–0.01)
NRBC BLD-RTO: 0 PER 100 WBC
PHOSPHATE SERPL-MCNC: 2.8 MG/DL (ref 2.6–4.7)
PLATELET # BLD AUTO: 153 K/UL (ref 150–400)
PMV BLD AUTO: 11.5 FL (ref 8.9–12.9)
POTASSIUM SERPL-SCNC: 3.9 MMOL/L (ref 3.5–5.1)
RBC # BLD AUTO: 3.99 M/UL (ref 4.1–5.7)
SERVICE CMNT-IMP: NORMAL
SODIUM SERPL-SCNC: 141 MMOL/L (ref 136–145)
WBC # BLD AUTO: 12.4 K/UL (ref 4.1–11.1)

## 2018-11-03 PROCEDURE — 85025 COMPLETE CBC W/AUTO DIFF WBC: CPT | Performed by: STUDENT IN AN ORGANIZED HEALTH CARE EDUCATION/TRAINING PROGRAM

## 2018-11-03 PROCEDURE — 83605 ASSAY OF LACTIC ACID: CPT | Performed by: STUDENT IN AN ORGANIZED HEALTH CARE EDUCATION/TRAINING PROGRAM

## 2018-11-03 PROCEDURE — 36415 COLL VENOUS BLD VENIPUNCTURE: CPT | Performed by: STUDENT IN AN ORGANIZED HEALTH CARE EDUCATION/TRAINING PROGRAM

## 2018-11-03 PROCEDURE — 83735 ASSAY OF MAGNESIUM: CPT | Performed by: STUDENT IN AN ORGANIZED HEALTH CARE EDUCATION/TRAINING PROGRAM

## 2018-11-03 PROCEDURE — 74011250636 HC RX REV CODE- 250/636: Performed by: FAMILY MEDICINE

## 2018-11-03 PROCEDURE — 84100 ASSAY OF PHOSPHORUS: CPT | Performed by: STUDENT IN AN ORGANIZED HEALTH CARE EDUCATION/TRAINING PROGRAM

## 2018-11-03 PROCEDURE — 80048 BASIC METABOLIC PNL TOTAL CA: CPT | Performed by: STUDENT IN AN ORGANIZED HEALTH CARE EDUCATION/TRAINING PROGRAM

## 2018-11-03 RX ORDER — EPINEPHRINE 0.3 MG/.3ML
0.3 INJECTION SUBCUTANEOUS
Qty: 2 SYRINGE | Refills: 1 | Status: SHIPPED | OUTPATIENT
Start: 2018-11-03 | End: 2018-11-03 | Stop reason: SDUPTHER

## 2018-11-03 RX ORDER — PREDNISONE 20 MG/1
TABLET ORAL
Qty: 10 TAB | Refills: 0 | Status: SHIPPED | OUTPATIENT
Start: 2018-11-03 | End: 2018-11-03 | Stop reason: SDUPTHER

## 2018-11-03 RX ORDER — EPINEPHRINE 0.3 MG/.3ML
0.3 INJECTION SUBCUTANEOUS
Qty: 2 SYRINGE | Refills: 2 | Status: SHIPPED | OUTPATIENT
Start: 2018-11-03 | End: 2018-11-03 | Stop reason: SDUPTHER

## 2018-11-03 RX ORDER — EPINEPHRINE 0.3 MG/.3ML
0.3 INJECTION SUBCUTANEOUS
Qty: 2 SYRINGE | Refills: 1 | Status: SHIPPED | OUTPATIENT
Start: 2018-11-03 | End: 2018-11-03

## 2018-11-03 RX ORDER — PREDNISONE 20 MG/1
TABLET ORAL
Qty: 10 TAB | Refills: 0 | Status: ON HOLD | OUTPATIENT
Start: 2018-11-03 | End: 2019-07-15

## 2018-11-03 RX ADMIN — Medication 10 ML: at 06:38

## 2018-11-03 RX ADMIN — SODIUM CHLORIDE 500 ML/HR: 900 INJECTION, SOLUTION INTRAVENOUS at 00:21

## 2018-11-03 RX ADMIN — Medication 10 ML: at 00:21

## 2018-11-03 NOTE — DISCHARGE SUMMARY
[address] [facility] [address] [place] [address]   Office [phone]  Fax [phone]       Discharge / Transfer / Off-Service Note     Name: Vinod Chisholm MRN: [id number]  Sex: Male   YOB: 1955  Age: 61 y.o. PCP: Renata Romero MD     Date of admission: 11/2/2018  Date of discharge/transfer: 11/3/2018    Attending physician at admission: Dr. Jaxson Waterman  Attending physician at discharge/transfer: Dr. Brenda Lorenzo  Resident physician at discharge/transfer: Santa Beck MD, PGY1     Consultants during hospitalization  None     Admission diagnoses   Anaphylaxis     Recommended follow-up after discharge  1. PCP   2. Allergist    Things to follow up on with PCP:  Possible beef allergy     Medication Changes:  1. New Medications:  START TAKING PREDNISONE 40MG DAILY FOR 5 DAYS (end 11/08)    2. Modified Medications: none    3. Discontinued Medications: none    History of Present Illness  Per admitting provider:     Vinod Chisholm is a 61 y.o. male with hx of 1 prior allergic reaction in the past who presented to the [facility] ER by EMS for allergic reaction, syncope, and urticaria. The patient reports that yesterday evening he ate a hot dog around 17:30. Later in the evening around 11:00, he started to develop itching that got progressively worse. He reports that at 12:30 am he took 2 benadryl and 2 aspirin, but this did not improve his symtpoms. He then having an unwitnessed episode of syncope as he walking to the bathroom. He reports calling out to his wife prior to passing out and feeling faint, but does not remember any thing else until being woken up by his wife. He wife reports that he heard him call out and fall, and she immediatly responded. She found him slumped over with his back and side toward the wall. She does not believe that he hit his head on the fall because he was sitting against the wall when she found him.  EMS was called and his BP was too low to be read according to his wife. He was given 500 cc NS bolus, and brought to the [facility] ED.      At the [facility] ED, his BP was found to be 36/27 systolic. Labs were obtained, which showed: CBC wnl, BMP s/f Cr of 1.15 (b/l  0.75). Troponin was negative. EKG showed NSR with occasional PVCs. He was given 1L NS bolus. He was also given solumedrol 125mg x1, atropine 0.5mg x1, pepcid 20mg x1, benadryl 25mg once, and Prednisone 60mg. He was started on 125ml NS, and he was reffered for admission for further management of his allergic reaction and hypotension.      On evaluation the patient reports that he is feeling better. He denies any dyspnea, CP, n/v, f/c, dizziness, vision changes, or other concerning symptoms. He reports that his pruritis is improving. He denies any tightness of the throat or mouth. He denies any swelling sensation of the tongue, lips, or throat. He denies any chest tightness or difficulty breathing. The patient reports that has a history of suspected meat allergy 2 years ago for which he went to the ED. At that time, he was prescribed an EPiPen, but it was never filled. He denies having followed up with an allergist of having any specific allergy testing. He reports that he has eaten hot dogs since the last episode without any symptoms. HOSPITAL COURSE  Allergic Reaction: Patient was admitted for symptom and vital sign monitoring. He received maintenance fluids. He remained asymptomatic and stable. He received a steroid burst and an Epi pen prescription upon discharge. Lactic Acidosis: The patient's lactic acid was elevated on admission (3.5). He received maintenance fluids in addition to a 1L bolus. His lactic acidemia resolved. Physical exam at discharge:    Vitals Reviewed. Constitutional: well-developed and well-nourished. HENT:   Head: Normocephalic and atraumatic. Eyes: Conjunctivae are normal. Pupils are equal, round, and reactive to light.    Throat: no swelling of tongue, lips, or posterior oropharynx  Neck: Normal range of motion. Neck supple. No JVD present. No tracheal deviation present. No thyromegaly present. Cardiovascular: Normal rate, regular rhythm and normal heart sounds. Exam reveals no friction rub. No murmur heard. Pulmonary/Chest: Effort normal and breath sounds normal. No stridor. No respiratory distress. no wheezes. no rales. no tenderness. Abdominal: Soft. Bowel sounds are normal. no distension and no mass. no tenderness. There is no rebound and no guarding. Musculoskeletal: Normal range of motion. no edema, tenderness or deformity. Lymphadenopathy: no cervical adenopathy. Neurological: No cranial nerve deficit. Coordination normal.   Skin: scattered areas of slightly raised, erythematous rashes along the inferior trunk and back  Psychiatric: normal mood and affect. Condition at discharge: Stable. Labs  Recent Labs     11/03/18  0422 11/02/18  0510   WBC 12.4* 15.0*   HGB 12.1 15.9   HCT 36.1* 46.2    168     Recent Labs     11/03/18  0422 11/02/18  1149 11/02/18  0510    139 139   K 3.9 3.9 4.2   * 105 106   CO2 25 22 26   BUN 11 14 16   CREA 0.91 1.20 1.15   * 158* 123*   CA 7.9* 8.4* 8.8   MG 2.0  --   --    PHOS 2.8  --   --      Recent Labs     11/02/18  0510   SGOT 29   ALT 30   AP 82   TBILI 0.5   TP 6.8   ALB 3.5   GLOB 3.3     Recent Labs     11/02/18  0510   TROIQ <0.05     Cultures  · None    Procedures / Diagnostic Studies  · None    Chronic diagnoses   Problem List as of 11/3/2018 Date Reviewed: 11/2/2018          Codes Class Noted - Resolved    Bradycardia ICD-10-CM: R00.1  ICD-9-CM: 427.89  11/2/2018 - Present        Syncope ICD-10-CM: R55  ICD-9-CM: 780.2  11/2/2018 - Present        * (Principal) Allergic reaction ICD-10-CM: T78.40XA  ICD-9-CM: 995.3  11/2/2018 - Present        Lactic acid acidosis ICD-10-CM: E87.2  ICD-9-CM: 276.2  11/2/2018 - Present        Anaphylaxis ICD-10-CM: T78. 2XXA  ICD-9-CM: 995.0 11/2/2018 - Present            Discharge/Transfer Medications  Discharge Medication List as of 11/3/2018  8:51 AM      CONTINUE these medications which have CHANGED    Details   !! EPINEPHrine (EPIPEN) 0.3 mg/0.3 mL injection 0.3 mL by IntraMUSCular route once as needed for up to 1 dose., Print, Disp-2 Syringe, R-2      predniSONE (DELTASONE) 20 mg tablet Take two tablets by mouth with breakfast every day for 5 days, Print, Disp-10 Tab, R-0       !! - Potential duplicate medications found. Please discuss with provider. CONTINUE these medications which have NOT CHANGED    Details   aspirin  mg tablet Take 1,000 mg by mouth every six (6) hours as needed for Pain., Historical Med      !! EPINEPHrine (EPIPEN) 0.3 mg/0.3 mL injection 0.3 mL by IntraMUSCular route once as needed for up to 1 dose., Print, Disp-2 Syringe, R-1      diphenhydrAMINE (BENADRYL) 25 mg capsule Take 50 mg by mouth every six (6) hours as needed., Historical Med       !! - Potential duplicate medications found. Please discuss with provider. Diet:  Regular diet.     Activity:  As tolerated    Disposition: Home or Self Care    Discharge instructions to patient/family  Please seek medical attention for any new or worsening symptoms particularly fever, chest pain, shortness of breath, abdominal pain, nausea, vomiting    Follow up plans/appointments  Follow-up Information     Follow up With Specialties Details Why Contact Info    Renata Romero MD Family Practice Go on 11/5/2018 For PCP follow up appointment at 1.45pm [address] [place] [address]  [phone]           Santa Beck MD  Family Medicine Resident       For Billing    Chief Complaint   Patient presents with   [facility] Allergic Reaction       Hospital Problems  Date Reviewed: 11/2/2018          Codes Class Noted POA    Bradycardia ICD-10-CM: R00.1  ICD-9-CM: 427.89  11/2/2018 Yes        Syncope ICD-10-CM: R55  ICD-9-CM: 780.2  11/2/2018 Yes        * (Principal) Allergic reaction ICD-10-CM: T78.40XA  ICD-9-CM: 995.3  11/2/2018 Yes        Lactic acid acidosis ICD-10-CM: E87.2  ICD-9-CM: 276.2  11/2/2018 Yes        Anaphylaxis ICD-10-CM: T78. 2XXA  ICD-9-CM: 995.0  11/2/2018 Yes

## 2018-11-03 NOTE — PROGRESS NOTES
[address] [facility]   Office [phone]  Fax [phone]          Assessment and [facility] [address] is a 61 y.o. male admitted for an allergic reaction. He has spent 1 night(s) in the hospital.    24 Hour Events: No acute events. Allergic Reaction: Urticaria, hypotension, and syncope 2/2 suspected beef allergy related to intake of hot dog on evening of 18. Patient reports having only one single episode of  allergic reaction in the past that included urticaria and throat swelling in the past 2 years ago. At that time he was given prescription for epinephrine injector, but it was not filled. He denies being evaluated by allergist in past. EMS provided 500cc NS bolus. In the ED, the patient has received  Solumedrol 125mg x 1, 1000cc NS bolus, atropine 0.5mg x 1, pepcid 20mg x 1, benadryl 25mg once, prednisone 60mg x 1.   -continue maintenance IVF  -Continue to monitor symptoms, breathing status, and vital signs.   -consider epinephrine, airway support, and other resuscitative measures as necessary  -recommend allergy work by PCP as outpatient  -recommend that patient avoid meats until he has allergy workup  -upon discharge will provide 5 day steroid burst  -ensure that patient has Epipen and instructions for use on discharge     Lactic Acidosis: LA was 2.01 in Ed  - continue IVF, patient received 1L bolus   - Trend: 3.5 -> 3.7->2.7->1.7 (resolved)      At Risk MARIANO: baseline Cr is 0.75, patient has cr today of 1.15  -continue IVF  -Trend: 1.15 -> 1.2->0. 91(resolved)     Patient will be discussed with Dr. Tammy Malave (Attending Physician)     Good Hopkins MD  Family Medicine Resident         Subjective / Objective     Subjective  Patient resting comfortably this morning. He denies any itching, swelling, dizziness or weakness. He is tolerating a vegan diet.     Temp (24hrs), Av.2 °F (36.8 °C), Min:97.6 °F (36.4 °C), Max:98.7 °F (37.1 °C)     Objective  Respiratory:   O2 Device: Room air   Visit Vitals  /70   Pulse 63   Temp (P) 98 °F (36.7 °C)   Resp 14   Ht 6' 3\" (1.905 m)   Wt 251 lb 1.7 oz (113.9 kg)   SpO2 93%   BMI 31.39 kg/m²     Physical Exam  Constitutional: well-developed and well-nourished. HENT:   Head: Normocephalic and atraumatic. Eyes: Conjunctivae are normal. Pupils are equal, round, and reactive to light. Throat: no swelling of tongue, lips, or posterior oropharynx  Neck: Normal range of motion. Neck supple. No JVD present. No tracheal deviation present. No thyromegaly present. Cardiovascular: Normal rate, regular rhythm and normal heart sounds. Exam reveals no friction rub. No murmur heard. Pulmonary/Chest: Effort normal and breath sounds normal. No stridor. No respiratory distress. no wheezes. no rales. no tenderness. Abdominal: Soft. Bowel sounds are normal. no distension and no mass. no tenderness. There is no rebound and no guarding. Musculoskeletal: Normal range of motion. no edema, tenderness or deformity. Lymphadenopathy:  no cervical adenopathy. Neurological: No cranial nerve deficit. Coordination normal.   Skin: No evidence of erythematous rash on abdomen   Psychiatric: normal mood and affect.      I/O:  Date 11/02/18 0700 - 11/03/18 0659 11/03/18 0700 - 11/04/18 0659   Shift [id number] 2142-8585 24 Hour Total 6905-3093 0964-0023 24 Hour Total   INTAKE   P.O.  480 480        P. O.  480 480      I. V.(mL/kg/hr)  1625 1625        Volume (0.9% sodium chloride infusion)  625 625        Volume (sodium chloride 0.9 % bolus infusion 1,000 mL)  1000 1000      Shift Total(mL/kg)  8717(91.5) 2433(91.6)      OUTPUT   Urine(mL/kg/hr)  775 775        Urine Voided  775 775      Shift Total(mL/kg)  775(6.8) 775(6.8)      NET  1330 1330      Weight (kg) 106.6 113.9 113.9 113.9 113.9 113.9       CBC:  Recent Labs     11/03/18  0422 11/02/18  0510   WBC 12.4* 15.0*   HGB 12.1 15.9   HCT 36.1* 46.2    292       Metabolic Panel:  Recent Labs 11/03/18  0422 11/02/18  1149 11/02/18  0510    139 139   K 3.9 3.9 4.2   * 105 106   CO2 25 22 26   BUN 11 14 16   CREA 0.91 1.20 1.15   * 158* 123*   CA 7.9* 8.4* 8.8   MG 2.0  --   --    PHOS 2.8  --   --    ALB  --   --  3.5   SGOT  --   --  29   ALT  --   --  30          For Billing    Chief Complaint   Patient presents with   [facility] Allergic Reaction       Hospital Problems  Date Reviewed: 11/2/2018          Codes Class Noted POA    Bradycardia ICD-10-CM: R00.1  ICD-9-CM: 427.89  11/2/2018 Yes        Syncope ICD-10-CM: R55  ICD-9-CM: 780.2  11/2/2018 Yes        * (Principal) Allergic reaction ICD-10-CM: T78.40XA  ICD-9-CM: 995.3  11/2/2018 Yes        Lactic acid acidosis ICD-10-CM: E87.2  ICD-9-CM: 276.2  11/2/2018 Yes        Anaphylaxis ICD-10-CM: T78. 2XXA  ICD-9-CM: 995.0  11/2/2018 Yes

## 2018-11-03 NOTE — PROGRESS NOTES
[address] [place] [address] with [facility]       Resident Progress Note in Brief    S: Patient seen and examined at bedside w/ Dr. Yessenia Davey. Patient is awake and alert  Denies cp/sob/n/v/rash/itching/lip or tongue swelling  Pt has no other concerns at the time     O:  Visit Vitals  /66 (BP 1 Location: Left arm, BP Patient Position: At rest)   Pulse 87   Temp 98.2 °F (36.8 °C)   Resp 21   Ht 6' 3\" (1.905 m)   Wt 235 lb (106.6 kg)   SpO2 93%   BMI 29.37 kg/m²     LA at 22:35: 2.7     Physical Examination:   General appearance - alert, well appearing, and in no distress  HENT - no lip/tongue swelling, conversing w/o difficulty  Chest - clear to auscultation, no wheezes, rales or rhonchi, symmetric air entry  Heart - normal rate, regular rhythm, normal S1, S2, no murmurs, rubs, clicks or gallops,   Neurological - alert, oriented, normal speech, no focal findings  Extremities - no pedal edema, no cyanosis  Skin - no rash    A/P:   Fidencio Niño is a 61 y.o. gentleman admitted for allergic rxn and syncope 2/2 suspected beef allergy    Lactic Acidosis: 3.5 --> 3.7 --> 2.7  - 500cc NS IV bolus  - Trend LA q6hrs until wnl  - Continue to monitor pt for sx allergic rxn; breathing/angioedema/rashes/vital signs      Please see daily progress note for detailed plan.      Yu Bowser MD  Family Medicine Resident

## 2018-11-03 NOTE — PROGRESS NOTES
Problem: Hypotension  Goal: *Blood pressure within specified parameters  Outcome: Progressing Towards Goal  Vital signs remain within acceptable ordered parameters  Goal: *Labs within defined limits  Outcome: Progressing Towards Goal  Lactic acid corrected to<2 after IV fluids and boluses given

## 2018-11-03 NOTE — DISCHARGE INSTRUCTIONS
HOME DISCHARGE INSTRUCTIONS    Francisco Carlton / [id number] : 1955    Admission date: 2018 Discharge date: 11/3/2018     Please bring this form with you to show your care provider at your follow-up appointment. Primary care provider:  Escobar Rothman MD    Discharging provider:  Alice Hatfield MD  - Family Medicine Resident  Dr. 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 . . . . . . . . . . . . . . . . . . . . . . . . . . . . . . . . . . . . . . . . . . . . . . . . . . . . . . . . . . . . . . . . . . . . . . Nydia Salgado FOLLOW-UP CARE RECOMMENDATIONS:    Appointments  Follow-up Information     Follow up With Specialties Details Why Contact Info    Escobar Rothman MD Family Practice Go on 2018 For PCP follow up appointment at 1.45pm [facility]  [phone]           Please follow up with your PCP regarding:  Beef allergy and allergist referral     MEDICATION CHANGES:  1. Start taking Prednisone:    Prednisone 20mg tabs:  2 tabs (40 mg) x 5 days  Dispense 10 tabs    2. Epinephrine auto-injector -  this medication on your way home from the hospital.  Practice with practice injector included in kit   Use at the onset of symptoms, repeat in 5 minutes if symptoms do not improve and go to hospital afterwards    Follow-up tests needed: None    Pending test results: At the time of your discharge the following test results are still pending: None  Please make sure you review these results with your outpatient follow-up provider(s). Specific symptoms to watch for: chest pain, shortness of breath, fever, chills, nausea, vomiting, diarrhea, change in mentation, falling, weakness, bleeding.      DIET/what to eat:  Beef-free diet, avoid beef and any other meats that may worsen symptoms     ACTIVITY:  Activity as tolerated    Wound care: n/a     Equipment needed:  None    What to do if new or unexpected symptoms occur? If you experience any of the above symptoms (or should other concerns or questions arise after discharge) please call your primary care physician. Return to the emergency room if you cannot get hold of your doctor. Use auto-injector and go to ER. · It is very important that you keep your follow-up appointment(s). · Please bring discharge papers, medication list (and/or medication bottles) to your follow-up appointments for review by your outpatient provider(s). · Please check the list of medications and be sure it includes every medication (even non-prescription medications) that your provider wants you to take. · It is important that you take the medication exactly as they are prescribed. · Keep your medication in the bottles provided by the pharmacist and keep a list of the medication names, dosages, and times to be taken in your wallet. · Do not take other medications without consulting your doctor. · If you have any questions about your medications or other instructions, please talk to your nurse or care provider before you leave the hospital.     Information obtained by:     I understand that if any problems occur once I am at home I am to contact my physician. These instructions were explained to me and I had the opportunity to ask questions. I understand and acknowledge receipt of the instructions indicated above.                                                                                                                                                Physician's or R.N.'s Signature                                                                  Date/Time                                                                                                                                              Patient or Representative Signature Date/Time

## 2018-11-03 NOTE — PROGRESS NOTES
0800, 0900:  Patient discharged to Home. Discharge instructions discussed and read to patient using teach back method. Written instructions provided to patient. Opportunity for questions was given. Patient transported via wheelchair.

## 2018-11-05 ENCOUNTER — OFFICE VISIT (OUTPATIENT)
Dept: FAMILY MEDICINE CLINIC | Age: 63
End: 2018-11-05

## 2018-11-05 VITALS
HEIGHT: 75 IN | BODY MASS INDEX: 30.99 KG/M2 | TEMPERATURE: 97.7 F | RESPIRATION RATE: 20 BRPM | OXYGEN SATURATION: 97 % | HEART RATE: 67 BPM | SYSTOLIC BLOOD PRESSURE: 134 MMHG | DIASTOLIC BLOOD PRESSURE: 76 MMHG | WEIGHT: 249.2 LBS

## 2018-11-05 DIAGNOSIS — Z12.5 SCREENING FOR PROSTATE CANCER: ICD-10-CM

## 2018-11-05 DIAGNOSIS — R73.9 HYPERGLYCEMIA: ICD-10-CM

## 2018-11-05 DIAGNOSIS — T78.2XXS ANAPHYLAXIS, SEQUELA: Primary | ICD-10-CM

## 2018-11-05 NOTE — PATIENT INSTRUCTIONS
Anaphylactic Reaction: Care Instructions Your Care Instructions A bad allergic reaction affects your whole body. Doctors call it an anaphylactic reaction. Your immune system may have reacted to food or medicine. Or maybe you had an insect bite or sting. This kind of reaction can happen the first time you come into contact with a substance. Or it may take many times before a substance causes a problem. You need to get help right away if your body reacts like this again. Follow-up care is a key part of your treatment and safety. Be sure to make and go to all appointments, and call your doctor if you are having problems. It's also a good idea to know your test results and keep a list of the medicines you take. How can you care for yourself at home? · If your doctor has prescribed medicine, such as an antihistamine, take it exactly as directed. Call your doctor if you think you are having a problem with your medicine. · Learn all you can about your allergies. You may be able to avoid a severe response when you do or don't do certain things. For instance, you can check food or drug labels for contents that might cause problems. · Your doctor may prescribe a shot of epinephrine to carry with you in case you have a severe reaction. Learn how to give yourself the shot. Keep it with you at all times. Make sure it has not . · Wear medical alert jewelry that lists your allergies. You can buy this at most drugstores. · Teach your family and friends about your allergies. Tell them what you need to avoid. Teach them what to do if you have a reaction. · Before you take any medicine, tell your doctor if you have had a bad response to any medicines in the past. 
When should you call for help? Give an epinephrine shot if: 
  · You think you are having a severe allergic reaction.  
 After giving an epinephrine shot call 911, even if you feel better. 
 Call 911 if:   · You have symptoms of a severe allergic reaction. These may include: 
? Sudden raised, red areas (hives) all over your body. ? Swelling of the throat, mouth, lips, or tongue. ? Trouble breathing. ? Passing out (losing consciousness). Or you may feel very lightheaded or suddenly feel weak, confused, or restless.  
  · You have been given an epinephrine shot, even if you feel better.  
 Call your doctor now or seek immediate medical care if: 
  · You have symptoms of an allergic reaction, such as: ? A rash or hives (raised, red areas on the skin). ? Itching. ? Swelling. ? Belly pain, nausea, or vomiting.  
 Watch closely for changes in your health, and be sure to contact your doctor if: 
  · You do not get better as expected. Where can you learn more? Go to http://karolina-omari.info/. Enter E186 in the search box to learn more about \"Anaphylactic Reaction: Care Instructions. \" Current as of: June 28, 2018 Content Version: 11.8 © 4833-8283 [employer]. Care instructions adapted under license by [employer] (which disclaims liability or warranty for this information). If you have questions about a medical condition or this instruction, always ask your healthcare professional. [address] any warranty or liability for your use of this information.

## 2018-11-05 NOTE — PROGRESS NOTES
Identified pt with two pt identifiers(name and ). Chief Complaint Patient presents with  
[facility] Follow Up  
  -3/18 [facility] Anaphylaxis - reaction to beef - he had eaten hot dogs earlier - he passed out while trying to get to bathroom - hit bottom Health Maintenance Due Topic  Shingrix Vaccine Age 50> (1 of 2)  FOBT Q 1 YEAR AGE 50-75  Influenza Age 5 to Adult He is going in a week to [facility] 
 
Wt Readings from Last 3 Encounters:  
18 249 lb 3.2 oz (113 kg) 18 251 lb 1.7 oz (113.9 kg) 16 233 lb 3.2 oz (105.8 kg) Temp Readings from Last 3 Encounters:  
18 97.7 °F (36.5 °C) (Oral) 18 (P) 98 °F (36.7 °C) 16 97.6 °F (36.4 °C) (Oral) BP Readings from Last 3 Encounters:  
18 134/76  
18 115/70  
16 131/87 Pulse Readings from Last 3 Encounters:  
18 67  
18 73  
16 82 Learning Assessment: 
:  
 
Learning Assessment 2016 PRIMARY LEARNER Patient HIGHEST LEVEL OF EDUCATION - PRIMARY LEARNER  2 YEARS OF COLLEGE  
BARRIERS PRIMARY LEARNER NONE  
CO-LEARNER CAREGIVER No  
PRIMARY LANGUAGE ENGLISH  
LEARNER PREFERENCE PRIMARY OTHER (COMMENT) ANSWERED BY self RELATIONSHIP SELF Depression Screening: 
:  
 
PHQ over the last two weeks 2018 Little interest or pleasure in doing things Not at all Feeling down, depressed, irritable, or hopeless Not at all Total Score PHQ 2 0 Fall Risk Assessment: 
:  
 
Fall Risk Assessment, last 12 mths 2018 Able to walk? Yes Fall in past 12 months? No  
 
 
Abuse Screening: 
:  
 
Abuse Screening Questionnaire 2018 Do you ever feel afraid of your partner? Christian Florence Are you in a relationship with someone who physically or mentally threatens you? [place] Is it safe for you to go home? Chanelle Ko Coordination of Care Questionnaire: 
:  
 
1) Have you been to an emergency room, urgent care clinic since your last visit? no  
Hospitalized since your last visit? no          
 
2) Have you seen or consulted any other health care providers outside of [address] [place] [address] since your last visit? no  (Include any pap smears or colon screenings in this section.) 3) Do you have an Advance Directive on file? no 
Are you interested in receiving information about Advance Directives? no 
 
Patient is accompanied by spouse I have received verbal consent from Jay Rossi to discuss any/all medical information while they are present in the room. Reviewed record in preparation for visit and have obtained necessary documentation. Medication reconciliation up to date and corrected with patient at this time.

## 2018-11-05 NOTE — PROGRESS NOTES
HISTORY OF PRESENT ILLNESS Betty Gibson is a 61 y.o. male. [facility] hospital admission for anaphylaxis after eating beef hot dog. He started itching, broke out in hives, and passed out. EMS called and difficulty getting blood pressure. Resuscitated with IV fluids and steroids. He was discharged 11/3/18. Is finishing up course of prednisone and has picked up Epi pen kit. He needs referral to Allergist. 
He is feeling fine now. This was second reaction to beef. Review of Systems All other systems reviewed and are negative. Visit Vitals /76 (BP 1 Location: Right arm, BP Patient Position: Sitting) Comment: manual  
Pulse 67 Temp 97.7 °F (36.5 °C) (Oral) Resp 20 Ht 6' 3\" (1.905 m) Wt 249 lb 3.2 oz (113 kg) SpO2 97% BMI 31.15 kg/m² Physical Exam  
Constitutional: He is oriented to person, place, and time. He appears well-developed and well-nourished. Cardiovascular: Normal rate, regular rhythm and normal heart sounds. Pulmonary/Chest: Effort normal and breath sounds normal.  
Neurological: He is alert and oriented to person, place, and time. Skin: No rash noted. Vitals reviewed. ASSESSMENT and PLAN 
  ICD-10-CM ICD-9-CM 1. Anaphylaxis, sequela T78. 2XXS 909.9 REFERRAL TO ALLERGY  
   CBC WITH AUTOMATED DIFF  
   METABOLIC PANEL, COMPREHENSIVE 2. Hyperglycemia R73.9 790.29 HEMOGLOBIN A1C WITH EAG 3. Screening for prostate cancer Z12.5 V76.44 PSA, DIAGNOSTIC (PROSTATE SPECIFIC AG)

## 2018-11-06 ENCOUNTER — PATIENT OUTREACH (OUTPATIENT)
Dept: OTHER | Age: 63
End: 2018-11-06

## 2018-11-06 LAB
ALBUMIN SERPL-MCNC: 4.5 G/DL (ref 3.6–4.8)
ALBUMIN/GLOB SERPL: 2 {RATIO} (ref 1.2–2.2)
ALP SERPL-CCNC: 68 IU/L (ref 39–117)
ALT SERPL-CCNC: 38 IU/L (ref 0–44)
AST SERPL-CCNC: 25 IU/L (ref 0–40)
BASOPHILS # BLD AUTO: 0 X10E3/UL (ref 0–0.2)
BASOPHILS NFR BLD AUTO: 0 %
BILIRUB SERPL-MCNC: 0.4 MG/DL (ref 0–1.2)
BUN SERPL-MCNC: 16 MG/DL (ref 8–27)
BUN/CREAT SERPL: 17 (ref 10–24)
CALCIUM SERPL-MCNC: 9.5 MG/DL (ref 8.6–10.2)
CHLORIDE SERPL-SCNC: 101 MMOL/L (ref 96–106)
CO2 SERPL-SCNC: 25 MMOL/L (ref 20–29)
CREAT SERPL-MCNC: 0.93 MG/DL (ref 0.76–1.27)
EOSINOPHIL # BLD AUTO: 0 X10E3/UL (ref 0–0.4)
EOSINOPHIL NFR BLD AUTO: 0 %
ERYTHROCYTE [DISTWIDTH] IN BLOOD BY AUTOMATED COUNT: 13.2 % (ref 12.3–15.4)
EST. AVERAGE GLUCOSE BLD GHB EST-MCNC: 108 MG/DL
GLOBULIN SER CALC-MCNC: 2.3 G/DL (ref 1.5–4.5)
GLUCOSE SERPL-MCNC: 114 MG/DL (ref 65–99)
HBA1C MFR BLD: 5.4 % (ref 4.8–5.6)
HCT VFR BLD AUTO: 41.3 % (ref 37.5–51)
HGB BLD-MCNC: 13.9 G/DL (ref 13–17.7)
IMM GRANULOCYTES # BLD: 0 X10E3/UL (ref 0–0.1)
IMM GRANULOCYTES NFR BLD: 0 %
LYMPHOCYTES # BLD AUTO: 0.9 X10E3/UL (ref 0.7–3.1)
LYMPHOCYTES NFR BLD AUTO: 7 %
MCH RBC QN AUTO: 29.6 PG (ref 26.6–33)
MCHC RBC AUTO-ENTMCNC: 33.7 G/DL (ref 31.5–35.7)
MCV RBC AUTO: 88 FL (ref 79–97)
MONOCYTES # BLD AUTO: 0.4 X10E3/UL (ref 0.1–0.9)
MONOCYTES NFR BLD AUTO: 3 %
NEUTROPHILS # BLD AUTO: 10.5 X10E3/UL (ref 1.4–7)
NEUTROPHILS NFR BLD AUTO: 90 %
PLATELET # BLD AUTO: 173 X10E3/UL (ref 150–379)
POTASSIUM SERPL-SCNC: 4 MMOL/L (ref 3.5–5.2)
PROT SERPL-MCNC: 6.8 G/DL (ref 6–8.5)
PSA SERPL-MCNC: 1.8 NG/ML (ref 0–4)
RBC # BLD AUTO: 4.7 X10E6/UL (ref 4.14–5.8)
SODIUM SERPL-SCNC: 142 MMOL/L (ref 134–144)
WBC # BLD AUTO: 11.9 X10E3/UL (ref 3.4–10.8)

## 2018-11-06 NOTE — PROGRESS NOTES
Verified  and address for HIPAA security. Introduced [employer] for patient. Patient does not identify any Care Management needs at this time and declines services. Patient stated he felt that they have everything covered. He was very appreciative. I did ask him if he was able to get his epi pen. He said it was on back order but they got it the next day. Also, asked if he had gotten an appointment with the allergist.  He said he had made the appointment this morning. I gave my contact information to him and let him know that if anything came up, he could call me. He said he would and thanked me.

## 2018-11-07 NOTE — PROGRESS NOTES
Labs show improvement in white blood cell count. Blood sugar remains a little elevated. This is likely related to recent high dose steroids. A1C level is normal. This goes against diabetes.  Normal prostate blood test.

## 2018-11-08 LAB
BACTERIA SPEC CULT: NORMAL
SERVICE CMNT-IMP: NORMAL

## 2018-11-09 ENCOUNTER — DOCUMENTATION ONLY (OUTPATIENT)
Dept: OTHER | Age: 63
End: 2018-11-09

## 2019-07-12 ENCOUNTER — ANESTHESIA EVENT (OUTPATIENT)
Dept: ENDOSCOPY | Age: 64
End: 2019-07-12
Payer: COMMERCIAL

## 2019-07-13 NOTE — ANESTHESIA PREPROCEDURE EVALUATION
Relevant Problems   No relevant active problems       Anesthetic History   No history of anesthetic complications            Review of Systems / Medical History  Patient summary reviewed, nursing notes reviewed and pertinent labs reviewed    Pulmonary  Within defined limits                 Neuro/Psych   Within defined limits           Cardiovascular  Within defined limits                     GI/Hepatic/Renal  Within defined limits              Endo/Other        Obesity     Other Findings              Physical Exam    Airway  Mallampati: II  TM Distance: > 6 cm  Neck ROM: normal range of motion   Mouth opening: Normal     Cardiovascular  Regular rate and rhythm,  S1 and S2 normal,  no murmur, click, rub, or gallop             Dental    Dentition: Caps/crowns     Pulmonary  Breath sounds clear to auscultation               Abdominal  GI exam deferred       Other Findings            Anesthetic Plan    ASA: 2  Anesthesia type: MAC            Anesthetic plan and risks discussed with: Patient

## 2019-07-15 ENCOUNTER — HOSPITAL ENCOUNTER (OUTPATIENT)
Age: 64
Setting detail: OUTPATIENT SURGERY
Discharge: HOME | End: 2019-07-15
Attending: INTERNAL MEDICINE | Admitting: INTERNAL MEDICINE
Payer: COMMERCIAL

## 2019-07-15 ENCOUNTER — ANESTHESIA (OUTPATIENT)
Dept: ENDOSCOPY | Age: 64
End: 2019-07-15
Payer: COMMERCIAL

## 2019-07-15 VITALS
BODY MASS INDEX: 27.98 KG/M2 | SYSTOLIC BLOOD PRESSURE: 125 MMHG | RESPIRATION RATE: 16 BRPM | HEART RATE: 53 BPM | OXYGEN SATURATION: 96 % | TEMPERATURE: 98.2 F | DIASTOLIC BLOOD PRESSURE: 69 MMHG | WEIGHT: 225 LBS | HEIGHT: 75 IN

## 2019-07-15 PROCEDURE — 77030013992 HC SNR POLYP ENDOSC BSC -B: Performed by: INTERNAL MEDICINE

## 2019-07-15 PROCEDURE — 88305 TISSUE EXAM BY PATHOLOGIST: CPT

## 2019-07-15 PROCEDURE — 77030010936 HC CLP LIG BSC -C: Performed by: INTERNAL MEDICINE

## 2019-07-15 PROCEDURE — 77030027957 HC TBNG IRR ENDOGTR BUSS -B: Performed by: INTERNAL MEDICINE

## 2019-07-15 PROCEDURE — 76060000032 HC ANESTHESIA 0.5 TO 1 HR: Performed by: INTERNAL MEDICINE

## 2019-07-15 PROCEDURE — 74011250636 HC RX REV CODE- 250/636

## 2019-07-15 PROCEDURE — 76040000007: Performed by: INTERNAL MEDICINE

## 2019-07-15 RX ORDER — LIDOCAINE HYDROCHLORIDE 20 MG/ML
INJECTION, SOLUTION EPIDURAL; INFILTRATION; INTRACAUDAL; PERINEURAL AS NEEDED
Status: DISCONTINUED | OUTPATIENT
Start: 2019-07-15 | End: 2019-07-15 | Stop reason: HOSPADM

## 2019-07-15 RX ORDER — ATROPINE SULFATE 0.1 MG/ML
0.5 INJECTION INTRAVENOUS
Status: DISCONTINUED | OUTPATIENT
Start: 2019-07-15 | End: 2019-07-15 | Stop reason: HOSPADM

## 2019-07-15 RX ORDER — FLUMAZENIL 0.1 MG/ML
0.2 INJECTION INTRAVENOUS
Status: DISCONTINUED | OUTPATIENT
Start: 2019-07-15 | End: 2019-07-15 | Stop reason: HOSPADM

## 2019-07-15 RX ORDER — SODIUM CHLORIDE 0.9 % (FLUSH) 0.9 %
5-40 SYRINGE (ML) INJECTION AS NEEDED
Status: DISCONTINUED | OUTPATIENT
Start: 2019-07-15 | End: 2019-07-15 | Stop reason: HOSPADM

## 2019-07-15 RX ORDER — DEXTROMETHORPHAN/PSEUDOEPHED 2.5-7.5/.8
1.2 DROPS ORAL
Status: DISCONTINUED | OUTPATIENT
Start: 2019-07-15 | End: 2019-07-15 | Stop reason: HOSPADM

## 2019-07-15 RX ORDER — SODIUM CHLORIDE 9 MG/ML
50 INJECTION, SOLUTION INTRAVENOUS CONTINUOUS
Status: DISCONTINUED | OUTPATIENT
Start: 2019-07-15 | End: 2019-07-15 | Stop reason: HOSPADM

## 2019-07-15 RX ORDER — PROPOFOL 10 MG/ML
INJECTION, EMULSION INTRAVENOUS AS NEEDED
Status: DISCONTINUED | OUTPATIENT
Start: 2019-07-15 | End: 2019-07-15 | Stop reason: HOSPADM

## 2019-07-15 RX ORDER — SODIUM CHLORIDE 0.9 % (FLUSH) 0.9 %
5-40 SYRINGE (ML) INJECTION EVERY 8 HOURS
Status: DISCONTINUED | OUTPATIENT
Start: 2019-07-15 | End: 2019-07-15 | Stop reason: HOSPADM

## 2019-07-15 RX ORDER — EPINEPHRINE 0.1 MG/ML
1 INJECTION INTRACARDIAC; INTRAVENOUS
Status: DISCONTINUED | OUTPATIENT
Start: 2019-07-15 | End: 2019-07-15 | Stop reason: HOSPADM

## 2019-07-15 RX ORDER — FENTANYL CITRATE 50 UG/ML
25-200 INJECTION, SOLUTION INTRAMUSCULAR; INTRAVENOUS
Status: DISCONTINUED | OUTPATIENT
Start: 2019-07-15 | End: 2019-07-15 | Stop reason: HOSPADM

## 2019-07-15 RX ORDER — MIDAZOLAM HYDROCHLORIDE 1 MG/ML
.25-5 INJECTION, SOLUTION INTRAMUSCULAR; INTRAVENOUS
Status: DISCONTINUED | OUTPATIENT
Start: 2019-07-15 | End: 2019-07-15 | Stop reason: HOSPADM

## 2019-07-15 RX ORDER — SODIUM CHLORIDE 9 MG/ML
INJECTION, SOLUTION INTRAVENOUS
Status: DISCONTINUED | OUTPATIENT
Start: 2019-07-15 | End: 2019-07-15 | Stop reason: HOSPADM

## 2019-07-15 RX ORDER — NALOXONE HYDROCHLORIDE 0.4 MG/ML
0.4 INJECTION, SOLUTION INTRAMUSCULAR; INTRAVENOUS; SUBCUTANEOUS
Status: DISCONTINUED | OUTPATIENT
Start: 2019-07-15 | End: 2019-07-15 | Stop reason: HOSPADM

## 2019-07-15 RX ADMIN — LIDOCAINE HYDROCHLORIDE 100 MG: 20 INJECTION, SOLUTION EPIDURAL; INFILTRATION; INTRACAUDAL; PERINEURAL at 12:42

## 2019-07-15 RX ADMIN — PROPOFOL 50 MG: 10 INJECTION, EMULSION INTRAVENOUS at 12:43

## 2019-07-15 RX ADMIN — PROPOFOL 50 MG: 10 INJECTION, EMULSION INTRAVENOUS at 12:54

## 2019-07-15 RX ADMIN — SODIUM CHLORIDE: 9 INJECTION, SOLUTION INTRAVENOUS at 13:04

## 2019-07-15 RX ADMIN — PROPOFOL 50 MG: 10 INJECTION, EMULSION INTRAVENOUS at 12:45

## 2019-07-15 RX ADMIN — SODIUM CHLORIDE: 9 INJECTION, SOLUTION INTRAVENOUS at 12:40

## 2019-07-15 RX ADMIN — PROPOFOL 100 MG: 10 INJECTION, EMULSION INTRAVENOUS at 12:42

## 2019-07-15 RX ADMIN — PROPOFOL 50 MG: 10 INJECTION, EMULSION INTRAVENOUS at 12:47

## 2019-07-15 RX ADMIN — PROPOFOL 50 MG: 10 INJECTION, EMULSION INTRAVENOUS at 12:49

## 2019-07-15 RX ADMIN — PROPOFOL 50 MG: 10 INJECTION, EMULSION INTRAVENOUS at 12:57

## 2019-07-15 RX ADMIN — PROPOFOL 50 MG: 10 INJECTION, EMULSION INTRAVENOUS at 13:01

## 2019-07-15 RX ADMIN — PROPOFOL 50 MG: 10 INJECTION, EMULSION INTRAVENOUS at 12:52

## 2019-07-15 NOTE — PROCEDURES
[address]  [address] [facility], [address] [place] [address]      Colonoscopy Operative Report    Aby De Luna  [id number]  1955      Procedure Type:   Colonoscopy with polypectomy (snare cautery)     Indications:    Screening colonoscopy   First one    Pre-operative Diagnosis: see indication above    Post-operative Diagnosis:  See findings below    :  Jeff Pascal MD    Surgical Assistant: None    Implants:  None    Referring Provider: Laz Mayen MD      Sedation:  MAC anesthesia Propofol      Procedure Details:  After informed consent was obtained with all risks and benefits of procedure explained and preoperative exam completed, the patient was taken to the endoscopy suite and placed in the left lateral decubitus position. Upon sequential sedation as per above, a digital rectal exam was performed demonstrating internal hemorrhoids. The Olympus videocolonoscope  was inserted in the rectum and carefully advanced to the cecum, which was identified by the ileocecal valve and appendiceal orifice. The cecum was identified by the ileocecal valve and appendiceal orifice. The quality of preparation was good. The colonoscope was slowly withdrawn with careful evaluation between folds. Retroflexion in the rectum was completed . Findings:   Rectum: normal  Sigmoid: normal  Descending Colon: normal  Transverse Colon: normal  Ascending Colon:  3 cm sessile polyp in distal ascending colon removed by hot snaring, 3 hemoclips placed at site  Cecum: normal        Specimen Removed:  as above    Complications: None. EBL:  None. Impression:    see findings    Recommendations: --Await pathology.       Recommendation for next colonscopy in 3 years      Signed By: Jeff Pascal MD     7/15/2019  1:11 PM

## 2019-07-15 NOTE — H&P
[address]  [address]      History and Physical       NAME:  Brent Mccullough   :   1955   MRN:   [id number]             History of Present Illness:  Patient is a 61 y.o. who is seen for screening colonoscopy. PMH:  Past Medical History:   Diagnosis Date    Contact dermatitis and other eczema, due to unspecified cause 2018    Lonestar tick       PSH:  History reviewed. No pertinent surgical history. Allergies: Allergies   Allergen Reactions    Beef Containing Products Anaphylaxis and Hives     Grass feed beef or hops       Home Medications:  Prior to Admission Medications   Prescriptions Last Dose Informant Patient Reported? Taking?   aspirin  mg tablet Not Taking at Unknown time  Yes No   Sig: Take 1,000 mg by mouth every six (6) hours as needed for Pain. diphenhydrAMINE (BENADRYL) 25 mg capsule Not Taking at Unknown time  Yes No   Sig: Take 50 mg by mouth every six (6) hours as needed.       Facility-Administered Medications: None       Hospital Medications:  Current Facility-Administered Medications   Medication Dose Route Frequency    0.9% sodium chloride infusion  50 mL/hr IntraVENous CONTINUOUS    sodium chloride (NS) flush 5-40 mL  5-40 mL IntraVENous Q8H    sodium chloride (NS) flush 5-40 mL  5-40 mL IntraVENous PRN    midazolam (VERSED) injection 0.25-5 mg  0.25-5 mg IntraVENous Multiple    fentaNYL citrate (PF) injection  mcg   mcg IntraVENous Multiple    naloxone (NARCAN) injection 0.4 mg  0.4 mg IntraVENous Multiple    flumazenil (ROMAZICON) 0.1 mg/mL injection 0.2 mg  0.2 mg IntraVENous Multiple    simethicone (MYLICON) 64WY/7.5QW oral drops 80 mg  1.2 mL Oral Multiple    atropine injection 0.5 mg  0.5 mg IntraVENous ONCE PRN    EPINEPHrine (ADRENALIN) 0.1 mg/mL syringe 1 mg  1 mg Endoscopically ONCE PRN       Social History:  Social History     Tobacco Use    Smoking status: Never Smoker    Smokeless tobacco: Never Used   Substance Use Topics    Alcohol use: Yes     Comment: occasional       Family History:  Family History   Problem Relation Age of Onset    Hypertension Mother     Stroke Mother     Psychiatric Disorder Father         dementia    Hypertension Sister              Review of Systems:      Constitutional: negative fever, negative chills, negative weight loss  Eyes:   negative visual changes  ENT:   negative sore throat, tongue or lip swelling  Respiratory:  negative cough, negative dyspnea  Cards:  negative for chest pain, palpitations, lower extremity edema  GI:   See HPI  :  negative for frequency, dysuria  Integument:  negative for rash and pruritus  Heme:  negative for easy bruising and gum/nose bleeding  Musculoskel: negative for myalgias,  back pain and muscle weakness  Neuro: negative for headaches, dizziness, vertigo  Psych:  negative for feelings of anxiety, depression       Objective:     Patient Vitals for the past 8 hrs:   Height Weight   07/15/19 1222 6' 3\" (1.905 m) 102.1 kg (225 lb)     No intake/output data recorded. No intake/output data recorded. EXAM:     NEURO-a&o   HEENT-wnl   LUNGS-clear    COR-regular rate and rhythym     ABD-soft , no tenderness, no rebound, good bs     EXT-no edema     Data Review     No results for input(s): WBC, HGB, HCT, PLT, HGBEXT, HCTEXT, PLTEXT in the last 72 hours. No results for input(s): NA, K, CL, CO2, BUN, CREA, GLU, PHOS, CA in the last 72 hours. No results for input(s): SGOT, GPT, AP, TBIL, TP, ALB, GLOB, GGT, AML, LPSE in the last 72 hours. No lab exists for component: AMYP, HLPSE  No results for input(s): INR, PTP, APTT in the last 72 hours. No lab exists for component: INREXT       Assessment:   · Screening colonoscopy     Patient Active Problem List   Diagnosis Code    Bradycardia R00.1    Syncope R55    Allergic reaction T78.40XA    Lactic acid acidosis E87.2    Anaphylaxis T78. 2XXA         Plan: · Endoscopic procedure with sedation     Signed By: Nataly Brunson MD     7/15/2019  12:25 PM

## 2019-07-15 NOTE — PROGRESS NOTES
Resolution 360 Clip x 3 placed post polypectomy in the Ascending Colon.     [facility] [id number]  REF [id number]  LOT [id number]  EXP 04-

## 2019-07-15 NOTE — ANESTHESIA POSTPROCEDURE EVALUATION
Post-Anesthesia Evaluation and Assessment    Patient: Garth Vallecillo MRN: [id number]  SSN: [id number]    YOB: 1955  Age: 61 y.o. Sex: male      I have evaluated the patient and they are stable and ready for discharge from the PACU. Cardiovascular Function/Vital Signs  Visit Vitals  /69   Pulse (!) 53   Temp 36.8 °C (98.2 °F)   Resp 16   Ht 6' 3\" (1.905 m)   Wt 102.1 kg (225 lb)   SpO2 96%   BMI 28.12 kg/m²       Patient is status post MAC anesthesia for Procedure(s):  COLONOSCOPY  ENDOSCOPIC POLYPECTOMY  RESOLUTION CLIP. Nausea/Vomiting: None    Postoperative hydration reviewed and adequate. Pain:  Pain Scale 1: Numeric (0 - 10) (07/15/19 1333)  Pain Intensity 1: 0 (07/15/19 1333)   Managed    Neurological Status: At baseline    Mental Status, Level of Consciousness: Alert and  oriented to person, place, and time    Pulmonary Status:   O2 Device: Room air (07/15/19 1333)   Adequate oxygenation and airway patent    Complications related to anesthesia: None    Post-anesthesia assessment completed. No concerns    Signed By: Linda Dsouza MD     July 15, 2019              Procedure(s):  COLONOSCOPY  ENDOSCOPIC POLYPECTOMY  RESOLUTION CLIP. MAC    <BSHSIANPOST>    Vitals Value Taken Time   /85 7/15/2019  1:38 PM   Temp     Pulse 55 7/15/2019  1:43 PM   Resp 0 7/15/2019  1:44 PM   SpO2 96 % 7/15/2019  1:43 PM   Vitals shown include unvalidated device data.

## 2019-07-15 NOTE — ROUTINE PROCESS
Yomaira Carranza 1955 
[id number] Situation: 
Verbal report received from: [facility] Procedure: Procedure(s): 
COLONOSCOPY 
ENDOSCOPIC POLYPECTOMY RESOLUTION CLIP Background: 
 
Preoperative diagnosis: SCREENING Postoperative diagnosis: 1. Colonic Polyp :  Dr. Citlalli Calzada Assistant(s): Endoscopy Technician-1: Judene Dance Endoscopy RN-1: Josep Moore RN Specimens:  
ID Type Source Tests Collected by Time Destination 1 : Ascending Colon Polyp Preservative   Renetta Ortiz MD 7/15/2019 1255 Pathology H. Pylori  no Assessment: 
Intra-procedure medications Anesthesia gave intra-procedure sedation and medications, see anesthesia flow sheet yes Intravenous fluids: NS@ Alyse Huddle Vital signs stable yes Abdominal assessment: round and soft  yes Recommendation: 
Discharge patient per MD order  yes. Return to floor  Na Family or Friend fam 
Permission to share finding with family or friend yes

## 2019-07-15 NOTE — PROGRESS NOTES

## 2019-07-15 NOTE — DISCHARGE INSTRUCTIONS
[address]  [address] [facility]  [id number]  1955    Discomfort:  Redness at IV site- apply warm compress to area; if redness or soreness persist- contact your physician  There may be a slight amount of blood passed from the rectum  Gaseous discomfort- walking, belching will help relieve any discomfort  You may not operate a vehicle for 12 hours  You may not engage in an occupation involving machinery or appliances for rest of today  You may not drink alcoholic beverages for at least 12 hours  Avoid making any critical decisions for at least 24 hour  DIET:  You may resume your regular diet - however -  remember your colon is empty and a heavy meal will produce gas. Avoid these foods:  vegetables, fried / greasy foods, carbonated drinks     ACTIVITY:  You may  resume your normal daily activities it is recommended that you spend the remainder of the day resting -  avoid any strenuous activity. CALL M.D.   ANY SIGN OF:   Increasing pain, nausea, vomiting  Abdominal distension (swelling)  New increased bleeding (oral or rectal)  Fever (chills)  Pain in chest area  Bloody discharge from nose or mouth  Shortness of breath      Follow-up Instructions:   Call Dr. Henrry Swann for any questions or problems at [phone]          ENDOSCOPY FINDINGS:   Your colonoscopy showed one polyp removed, rest of colon was normal.  Telephone # [phone]      Signed By: Henrry Swann MD     7/15/2019  1:14 PM       DISCHARGE SUMMARY from Nurse    The following personal items collected during your admission are returned to you:   Dental Appliance: Dental Appliances: None  Vision: Visual Aid: Glasses  Hearing Aid:    Jewelry:    Clothing:    Other Valuables:    Valuables sent to safe:

## 2019-11-08 ENCOUNTER — OFFICE VISIT (OUTPATIENT)
Dept: FAMILY MEDICINE CLINIC | Age: 64
End: 2019-11-08

## 2019-11-08 ENCOUNTER — HOSPITAL ENCOUNTER (OUTPATIENT)
Dept: LAB | Age: 64
Discharge: HOME | End: 2019-11-08

## 2019-11-08 VITALS
RESPIRATION RATE: 20 BRPM | DIASTOLIC BLOOD PRESSURE: 80 MMHG | HEIGHT: 75 IN | HEART RATE: 57 BPM | BODY MASS INDEX: 29.74 KG/M2 | SYSTOLIC BLOOD PRESSURE: 120 MMHG | OXYGEN SATURATION: 97 % | TEMPERATURE: 97.6 F | WEIGHT: 239.2 LBS

## 2019-11-08 DIAGNOSIS — Z00.00 ROUTINE MEDICAL EXAM: ICD-10-CM

## 2019-11-08 DIAGNOSIS — Z00.00 ROUTINE MEDICAL EXAM: Primary | ICD-10-CM

## 2019-11-08 DIAGNOSIS — Z12.5 PROSTATE CANCER SCREENING: ICD-10-CM

## 2019-11-08 PROBLEM — Z91.018 ALLERGY TO ALPHA-GAL: Status: ACTIVE | Noted: 2019-11-08

## 2019-11-08 LAB
ALBUMIN SERPL-MCNC: 4.2 G/DL (ref 3.5–5)
ALBUMIN/GLOB SERPL: 1.5 {RATIO} (ref 1.1–2.2)
ALP SERPL-CCNC: 94 U/L (ref 45–117)
ALT SERPL-CCNC: 28 U/L (ref 12–78)
ANION GAP SERPL CALC-SCNC: 4 MMOL/L (ref 5–15)
AST SERPL-CCNC: 13 U/L (ref 15–37)
BILIRUB SERPL-MCNC: 0.5 MG/DL (ref 0.2–1)
BUN SERPL-MCNC: 20 MG/DL (ref 6–20)
BUN/CREAT SERPL: 22 (ref 12–20)
CALCIUM SERPL-MCNC: 9.1 MG/DL (ref 8.5–10.1)
CHLORIDE SERPL-SCNC: 107 MMOL/L (ref 97–108)
CHOLEST SERPL-MCNC: 165 MG/DL
CO2 SERPL-SCNC: 30 MMOL/L (ref 21–32)
CREAT SERPL-MCNC: 0.89 MG/DL (ref 0.7–1.3)
ERYTHROCYTE [DISTWIDTH] IN BLOOD BY AUTOMATED COUNT: 12.8 % (ref 11.5–14.5)
EST. AVERAGE GLUCOSE BLD GHB EST-MCNC: 105 MG/DL
GLOBULIN SER CALC-MCNC: 2.8 G/DL (ref 2–4)
GLUCOSE SERPL-MCNC: 91 MG/DL (ref 65–100)
HBA1C MFR BLD: 5.3 % (ref 4.2–6.3)
HCT VFR BLD AUTO: 46.9 % (ref 36.6–50.3)
HDLC SERPL-MCNC: 41 MG/DL
HDLC SERPL: 4 {RATIO} (ref 0–5)
HGB BLD-MCNC: 14.8 G/DL (ref 12.1–17)
LDLC SERPL CALC-MCNC: 90 MG/DL (ref 0–100)
LIPID PROFILE,FLP: ABNORMAL
MCH RBC QN AUTO: 30.1 PG (ref 26–34)
MCHC RBC AUTO-ENTMCNC: 31.6 G/DL (ref 30–36.5)
MCV RBC AUTO: 95.5 FL (ref 80–99)
NRBC # BLD: 0 K/UL (ref 0–0.01)
NRBC BLD-RTO: 0 PER 100 WBC
PLATELET # BLD AUTO: 151 K/UL (ref 150–400)
PMV BLD AUTO: 12 FL (ref 8.9–12.9)
POTASSIUM SERPL-SCNC: 4.1 MMOL/L (ref 3.5–5.1)
PROT SERPL-MCNC: 7 G/DL (ref 6.4–8.2)
PSA SERPL-MCNC: 2.4 NG/ML (ref 0.01–4)
RBC # BLD AUTO: 4.91 M/UL (ref 4.1–5.7)
SODIUM SERPL-SCNC: 141 MMOL/L (ref 136–145)
TRIGL SERPL-MCNC: 170 MG/DL (ref ?–150)
TSH SERPL DL<=0.05 MIU/L-ACNC: 1.59 UIU/ML (ref 0.36–3.74)
VLDLC SERPL CALC-MCNC: 34 MG/DL
WBC # BLD AUTO: 5.2 K/UL (ref 4.1–11.1)

## 2019-11-08 NOTE — PROGRESS NOTES
Subjective:     Rl Thompson is a 59 y.o. male presenting for annual exam and complete physical.    Patient Active Problem List    Diagnosis Date Noted    Allergy to alpha-gal 11/08/2019    Bradycardia 11/02/2018    Syncope 11/02/2018    Allergic reaction 11/02/2018    Lactic acid acidosis 11/02/2018    Anaphylaxis 11/02/2018     Current Outpatient Medications   Medication Sig Dispense Refill    diphenhydrAMINE (BENADRYL) 25 mg capsule Take 50 mg by mouth every six (6) hours as needed. Allergies   Allergen Reactions    Beef Containing Products Anaphylaxis and Hives     Grass feed beef or hops     Past Medical History:   Diagnosis Date    Contact dermatitis and other eczema, due to unspecified cause 2018    Lonestar tick     Past Surgical History:   Procedure Laterality Date    COLONOSCOPY N/A 7/15/2019    COLON[facility] performed [facility]a [facility] MD at [facility] ENDOSCOPY     Family History   Problem Relation Age of Onset   [place] Hypertension Mother     Stroke Mother     Psychiatric Disorder Father         dementia    Hypertension Sister      Social History     Tobacco Use    Smoking status: Never Smoker    Smokeless tobacco: Never Used   Substance Use Topics    Alcohol use: Yes     Comment: occasional             Review of Systems  Pertinent items are noted in HPI. C/O cold feet at night.   Objective:     Visit Vitals  /80 (BP 1 Location: Right arm, BP Patient Position: Sitting) Comment: manual   Pulse (!) 57   Temp 97.6 °F (36.4 °C) (Oral)   Resp 20   Ht 6' 3\" (1.905 m)   Wt 239 lb 3.2 oz (108.5 kg)   SpO2 97%   BMI 29.90 kg/m²     Physical exam:   General appearance - alert, well appearing, and in no distress  Mental status - alert, oriented to person, place, and time  Ears - bilateral TM's and external ear canals normal  Nose - normal and patent, no erythema, discharge or polyps  Mouth - mucous membranes moist, pharynx normal without lesions  Neck - supple, no significant adenopathy  Chest - clear to auscultation, no wheezes, rales or rhonchi, symmetric air entry  Heart - normal rate, regular rhythm, normal S1, S2, no murmurs, rubs, clicks or gallops  Abdomen - soft, nontender, nondistended, no masses or organomegaly  Back exam - full range of motion, no tenderness, palpable spasm or pain on motion  Neurological - alert, oriented, normal speech, no focal findings or movement disorder noted  Musculoskeletal - no joint tenderness, deformity or swelling  Extremities - peripheral pulses normal, no pedal edema, no clubbing or cyanosis     Assessment/Plan:     Well male exam  lose weight, increase physical activity, follow low fat diet, follow low salt diet, routine labs ordered. ICD-10-CM ICD-9-CM    1. Routine medical exam Z00.00 V70.0 CBC W/O DIFF      METABOLIC PANEL, COMPREHENSIVE      LIPID PANEL      HEMOGLOBIN A1C WITH EAG      TSH 3RD GENERATION   2. Prostate cancer screening Z12.5 V76.44 PSA, DIAGNOSTIC (PROSTATE SPECIFIC AG)   . Labs drawn. Pt declined FLU vaccine. Discussed the patient's BMI with him. The BMI follow up plan is as follows:     dietary management education, guidance, and counseling  encourage exercise  monitor weight  prescribed dietary intake    An After Visit Summary was printed and given to the patient.

## 2019-11-08 NOTE — PROGRESS NOTES
Identified pt with two pt identifiers(name and ). Chief Complaint   Patient presents with    Physical        Health Maintenance Due   Topic    Shingrix Vaccine Age 50> (1 of 2)    FOBT Q 1 YEAR AGE 50-75    Had colonoscopy 7/15/19 Dr Iris Vences    Wt Readings from Last 3 Encounters:   19 239 lb 3.2 oz (108.5 kg)   07/15/19 225 lb (102.1 kg)   18 249 lb 3.2 oz (113 kg)     Temp Readings from Last 3 Encounters:   19 97.6 °F (36.4 °C) (Oral)   07/15/19 98.2 °F (36.8 °C)   18 97.7 °F (36.5 °C) (Oral)     BP Readings from Last 3 Encounters:   19 120/80   07/15/19 125/69   18 134/76     Pulse Readings from Last 3 Encounters:   19 (!) 57   07/15/19 (!) 53   18 67         Learning Assessment:  :     Learning Assessment 2016   PRIMARY LEARNER Patient   HIGHEST LEVEL OF EDUCATION - PRIMARY LEARNER  2 YEARS OF COLLEGE   BARRIERS PRIMARY LEARNER NONE   CO-LEARNER CAREGIVER No   PRIMARY LANGUAGE ENGLISH   LEARNER PREFERENCE PRIMARY OTHER (COMMENT)   ANSWERED BY self   RELATIONSHIP SELF       Depression Screening:  :     3 most recent PHQ Screens 2019   Little interest or pleasure in doing things Not at all   Feeling down, depressed, irritable, or hopeless Not at all   Total Score PHQ 2 0       Fall Risk Assessment:  :     Fall Risk Assessment, last 12 mths 2019   Able to walk? Yes   Fall in past 12 months? No       Abuse Screening:  :     Abuse Screening Questionnaire 2019   Do you ever feel afraid of your partner? N N N   Are you in a relationship with someone who physically or mentally threatens you? N N N   Is it safe for you to go home?  Bonnie Cordero       Coordination of Care Questionnaire:  :     1) Have you been to an emergency room, urgent care clinic since your last visit? no   Hospitalized since your last visit? no             2) Have you seen or consulted any other health care providers outside of [address] [place] [address] since your last visit? yes Allergist, Dr Deni Bradford (Include any pap smears or colon screenings in this section.)    3) Do you have an Advance Directive on file? no  Are you interested in receiving information about Advance Directives? no    Reviewed record in preparation for visit and have obtained necessary documentation. Medication reconciliation up to date and corrected with patient at this time.

## 2019-11-08 NOTE — PATIENT INSTRUCTIONS
Well Visit, Men 48 to 72: Care Instructions Your Care Instructions Physical exams can help you stay healthy. Your doctor has checked your overall health and may have suggested ways to take good care of yourself. He or she also may have recommended tests. At home, you can help prevent illness with healthy eating, regular exercise, and other steps. Follow-up care is a key part of your treatment and safety. Be sure to make and go to all appointments, and call your doctor if you are having problems. It's also a good idea to know your test results and keep a list of the medicines you take. How can you care for yourself at home? · Reach and stay at a healthy weight. This will lower your risk for many problems, such as obesity, diabetes, heart disease, and high blood pressure. · Get at least 30 minutes of exercise on most days of the week. Walking is a good choice. You also may want to do other activities, such as running, swimming, cycling, or playing tennis or team sports. · Do not smoke. Smoking can make health problems worse. If you need help quitting, talk to your doctor about stop-smoking programs and medicines. These can increase your chances of quitting for good. · Protect your skin from too much sun. When you're outdoors from 10 a.m. to 4 p.m., stay in the shade or cover up with clothing and a hat with a wide brim. Wear sunglasses that block UV rays. Even when it's cloudy, put broad-spectrum sunscreen (SPF 30 or higher) on any exposed skin. · See a dentist one or two times a year for checkups and to have your teeth cleaned. · Wear a seat belt in the car. Follow your doctor's advice about when to have certain tests. These tests can spot problems early. · Cholesterol. Your doctor will tell you how often to have this done based on your overall health and other things that can increase your risk for heart attack and stroke. · Blood pressure.  Have your blood pressure checked during a routine doctor visit. Your doctor will tell you how often to check your blood pressure based on your age, your blood pressure results, and other factors. · Prostate exam. Talk to your doctor about whether you should have a blood test (called a PSA test) for prostate cancer. Experts recommend that you discuss the benefits and risks of the test with your doctor before you decide whether to have this test. 
· Diabetes. Ask your doctor whether you should have tests for diabetes. · Vision. Some experts recommend that you have yearly exams for glaucoma and other age-related eye problems starting at age 48. · Hearing. Tell your doctor if you notice any change in your hearing. You can have tests to find out how well you hear. · Colorectal cancer. Your risk for colorectal cancer gets higher as you get older. Some experts say that adults should start regular screening at age 48 and stop at age 76. Others say to start before age 48 or continue after age 76. Talk with your doctor about your risk and when to start and stop screening. · Heart attack and stroke risk. At least every 4 to 6 years, you should have your risk for heart attack and stroke assessed. Your doctor uses factors such as your age, blood pressure, cholesterol, and whether you smoke or have diabetes to show what your risk for a heart attack or stroke is over the next 10 years. · Abdominal aortic aneurysm. Ask your doctor whether you should have a test to check for an aneurysm. You may need a test if you ever smoked or if your parent, brother, sister, or child has had an aneurysm. When should you call for help? Watch closely for changes in your health, and be sure to contact your doctor if you have any problems or symptoms that concern you. Where can you learn more? Go to http://karolina-omari.info/. Enter O392 in the search box to learn more about \"Well Visit, Men 48 to 72: Care Instructions. \" Current as of: December 13, 2018 Content Version: 12.2 © 5637-9026 [employer]. Care instructions adapted under license by [employer] (which disclaims liability or warranty for this information). If you have questions about a medical condition or this instruction, always ask your healthcare professional. [address] any warranty or liability for your use of this information. Recombinant Zoster (Shingles) Vaccine, RZV: What you need to know Why get vaccinated? Shingles (also called herpes zoster, or just zoster) is a painful skin rash, often with blisters. Shingles is caused by the varicella zoster virus, the same virus that causes chickenpox. After you have chickenpox, the virus stays in your body and can cause shingles later in life. You can't catch shingles from another person. However, a person who has never had chickenpox (or chickenpox vaccine) could get chickenpox from someone with shingles. A shingles rash usually appears on one side of the face or body and heals within 2 to 4 weeks. Its main symptom is pain, which can be severe. Other symptoms can include fever, headache, chills, and upset stomach. Very rarely, a shingles infection can lead to pneumonia, hearing problems, blindness, brain inflammation (encephalitis), or death. For about 1 person in 5, severe pain can continue even long after the rash has cleared up. This long-lasting pain is called post-herpetic neuralgia (PHN). Shingles is far more common in people 48years of age and older than in younger people, and the risk increases with age. It is also more common in people whose immune system is weakened because of a disease such as cancer, or by drugs such as steroids or chemotherapy. At least 1 million people a year in the [facility] get shingles. Shingles vaccine (recombinant) Recombinant shingles vaccine was approved by FDA in 2017 for the prevention of shingles.  In clinical trials, it was more than 90% effective in preventing shingles. It can also reduce the likelihood of PHN. Two doses, 2 to 6 months apart, are recommended for adults 48 and older. This vaccine is also recommended for people who have already gotten the live shingles vaccine (Zostavax). There is no live virus in this vaccine. Some people should not get this vaccine Tell your vaccine provider if you: 
· Have any severe, life-threatening allergies. A person who has ever had a life-threatening allergic reaction after a dose of recombinant shingles vaccine, or has a severe allergy to any component of this vaccine, may be advised not to be vaccinated. Ask your health care provider if you want information about vaccine components. · Are pregnant or breastfeeding. There is not much information about use of recombinant shingles vaccine in pregnant or nursing women. Your healthcare provider might recommend delaying vaccination. · Are not feeling well. If you have a mild illness, such as a cold, you can probably get the vaccine today. If you are moderately or severely ill, you should probably wait until you recover. Your doctor can advise you. Risks of a vaccine reaction With any medicine, including vaccines, there is a chance of reactions. After recombinant shingles vaccination, a person might experience: 
· Pain, redness, soreness, or swelling at the site of the injection · Headache, muscle aches, fever, shivering, fatigue In clinical trials, most people got a sore arm with mild or moderate pain after vaccination, and some also had redness and swelling where they got the shot. Some people felt tired, had muscle pain, a headache, shivering, fever, stomach pain, or nausea. About 1 out of 6 people who got recombinant zoster vaccine experienced side effects that prevented them from doing regular activities. Symptoms went away on their own in about 2 to 3 days. Side effects were more common in younger people. You should still get the second dose of recombinant zoster vaccine even if you had one of these reactions after the first dose. Other things that could happen after this vaccine: · People sometimes faint after medical procedures, including vaccination. Sitting or lying down for about 15 minutes can help prevent fainting and injuries caused by a fall. Tell your provider if you feel dizzy or have vision changes or ringing in the ears. · Some people get shoulder pain that can be more severe and longer-lasting than routine soreness that can follow injections. This happens very rarely. · Any medication can cause a severe allergic reaction. Such reactions to a vaccine are estimated at about 1 in a million doses, and would happen within a few minutes to a few hours after the vaccination. As with any medicine, there is a very remote chance of a vaccine causing a serious injury or death. The safety of vaccines is always being monitored. For more information, visit: www.cdc.gov/vaccinesafety/ What if there is a serious problem? What should I look for? · Look for anything that concerns you, such as signs of a severe allergic reaction, very high fever, or unusual behavior. Signs of a severe allergic reaction can include hives, swelling of the face and throat, difficulty breathing, a fast heartbeat, dizziness, and weakness. These would usually start a few minutes to a few hours after the vaccination. What should I do? · If you think it is a severe allergic reaction or other emergency that can't wait, call 9-1-1 or get to the nearest hospital. Otherwise, call your health care provider. · Afterward, the reaction should be reported to the Vaccine Adverse Event Reporting System (VAERS). Your doctor should file this report, or you can do it yourself through the VAERS website at www.vaers. hhs.gov, or by calling 9-157.221.1678. VAERS does not give medical advice. Linda Felipe How can I learn more? · Ask your health care provider. He or she can give you the vaccine package insert or suggest other sources of information. · Call your local or state health department. · Contact the Centers for Disease Control and Prevention (CDC): 
? Call 9-871.849.4843 (1-800-CDC-INFO) or 
? Visit CDC's website at www.cdc.gov/vaccines Vaccine Information Statement (Interim) Recombinant Zoster Vaccine 2/12/2018 [facility] and [employer] Centers for Disease Control and Prevention Many Vaccine Information Statements are available in Indonesian and other languages. See www.immunize.org/vis. Hojas de Información Sobre Vacunas están disponibles en Español y en muchos otros idiomas. Visite Samson.si Care instructions adapted under license by [employer] (which disclaims liability or warranty for this information). If you have questions about a medical condition or this instruction, always ask your healthcare professional. [place] [address] any warranty or liability for your use of this information. Benign Prostatic Hyperplasia: Care Instructions Your Care Instructions Benign prostatic hyperplasia, or BPH, is an enlarged prostate gland. The prostate is a small gland that makes some of the fluid in semen. Prostate enlargement happens to almost all men as they age. It is usually not serious. BPH does not cause prostate cancer. As the prostate gets bigger, it may partly block the flow of urine. You may have a hard time getting a urine stream started or completely stopped. BPH can cause dribbling. You may have a weak urine stream, or you may have to urinate more often than you used to, especially at night. Most men find these problems easy to manage. You do not need treatment unless your symptoms bother you a lot or you have other problems, such as bladder infections or stones.  In these cases, medicines may help. Surgery is not needed unless the urine flow is blocked or the symptoms do not get better with medicine. Follow-up care is a key part of your treatment and safety. Be sure to make and go to all appointments, and call your doctor if you are having problems. It's also a good idea to know your test results and keep a list of the medicines you take. How can you care for yourself at home? · Take plenty of time to urinate. Try to relax. · Try \"double voiding. \" Urinate as much you can, relax for a few moments, and then try to urinate again. · Sit on the toilet to urinate. · Read or think of other things while you are waiting. · Turn on a faucet, or try to picture running water. Some men find that this helps get their urine flowing. · If dribbling is a problem, wash your penis daily to avoid skin irritation and infection. · Avoid caffeine and alcohol. These drinks will increase how often you need to urinate. Spread your fluid intake throughout the day. If the urge to urinate often wakes you at night, limit your fluid intake in the evening. Urinate right before you go to bed. · Many over-the-counter cold and allergy medicines can make the symptoms of BPH worse. Avoid antihistamines, decongestants, and allergy pills, if you can. Read the warnings on the package. · If you take any prescription medicines, especially tranquilizers or antidepressants, ask your doctor or pharmacist whether they can cause urination problems. There may be other medicines you can use that do not cause urinary problems. · Be safe with medicines. Take your medicines exactly as prescribed. Call your doctor if you think you are having a problem with your medicine. When should you call for help? Call your doctor now or seek immediate medical care if: 
  · You cannot urinate at all.  
  · You have symptoms of a urinary infection. For example: ? You have blood or pus in your urine. ? You have pain in your back just below your rib cage. This is called flank pain. ? You have a fever, chills, or body aches. ? It hurts to urinate. ? You have groin or belly pain.  
 Watch closely for changes in your health, and be sure to contact your doctor if: 
  · It hurts when you ejaculate.  
  · Your urinary problems get a lot worse or bother you a lot. Where can you learn more? Go to http://karolina-omari.info/. Enter C042 in the search box to learn more about \"Benign Prostatic Hyperplasia: Care Instructions. \" Current as of: May 28, 2019 Content Version: 12.2 © 8717-6781 [employer] Care instructions adapted under license by [employer] (which disclaims liability or warranty for this information). If you have questions about a medical condition or this instruction, always ask your healthcare professional. [place] [address] any warranty or liability for your use of this information. Body Mass Index: Care Instructions Your Care Instructions Body mass index (BMI) can help you see if your weight is raising your risk for health problems. It uses a formula to compare how much you weigh with how tall you are. · A BMI lower than 18.5 is considered underweight. · A BMI between 18.5 and 24.9 is considered healthy. · A BMI between 25 and 29.9 is considered overweight. A BMI of 30 or higher is considered obese. If your BMI is in the normal range, it means that you have a lower risk for weight-related health problems. If your BMI is in the overweight or obese range, you may be at increased risk for weight-related health problems, such as high blood pressure, heart disease, stroke, arthritis or joint pain, and diabetes. If your BMI is in the underweight range, you may be at increased risk for health problems such as fatigue, lower protection (immunity) against illness, muscle loss, bone loss, hair loss, and hormone problems. BMI is just one measure of your risk for weight-related health problems. You may be at higher risk for health problems if you are not active, you eat an unhealthy diet, or you drink too much alcohol or use tobacco products. Follow-up care is a key part of your treatment and safety. Be sure to make and go to all appointments, and call your doctor if you are having problems. It's also a good idea to know your test results and keep a list of the medicines you take. How can you care for yourself at home? · Practice healthy eating habits. This includes eating plenty of fruits, vegetables, whole grains, lean protein, and low-fat dairy. · If your doctor recommends it, get more exercise. Walking is a good choice. Bit by bit, increase the amount you walk every day. Try for at least 30 minutes on most days of the week. · Do not smoke. Smoking can increase your risk for health problems. If you need help quitting, talk to your doctor about stop-smoking programs and medicines. These can increase your chances of quitting for good. · Limit alcohol to 2 drinks a day for men and 1 drink a day for women. Too much alcohol can cause health problems. If you have a BMI higher than 25 · Your doctor may do other tests to check your risk for weight-related health problems. This may include measuring the distance around your waist. A waist measurement of more than 40 inches in men or 35 inches in women can increase the risk of weight-related health problems. · Talk with your doctor about steps you can take to stay healthy or improve your health. You may need to make lifestyle changes to lose weight and stay healthy, such as changing your diet and getting regular exercise. If you have a BMI lower than 18.5 · Your doctor may do other tests to check your risk for health problems. · Talk with your doctor about steps you can take to stay healthy or improve your health.  You may need to make lifestyle changes to gain or maintain weight and stay healthy, such as getting more healthy foods in your diet and doing exercises to build muscle. Where can you learn more? Go to http://karolina-omari.info/. Enter S176 in the search box to learn more about \"Body Mass Index: Care Instructions. \" Current as of: October 13, 2016 Content Version: 11.4 © 3180-5100 [employer]. Care instructions adapted under license by [employer] (which disclaims liability or warranty for this information). If you have questions about a medical condition or this instruction, always ask your healthcare professional. [place] [address] any warranty or liability for your use of this information.

## 2019-11-10 NOTE — PROGRESS NOTES
Your labs look great! Normal blood cell counts. Normal sugar, liver, and kidney tests. Normal thyroid test. Good cholesterol numbers. Normal prostate blood test. Keep up the good work!

## 2022-03-18 PROBLEM — E87.20 LACTIC ACID ACIDOSIS: Status: ACTIVE | Noted: 2018-11-02

## 2022-03-19 PROBLEM — Z91.018 ALLERGY TO ALPHA-GAL: Status: ACTIVE | Noted: 2019-11-08

## 2022-03-19 PROBLEM — T78.40XA ALLERGIC REACTION: Status: ACTIVE | Noted: 2018-11-02

## 2022-03-19 PROBLEM — R00.1 BRADYCARDIA: Status: ACTIVE | Noted: 2018-11-02

## 2022-03-19 PROBLEM — R55 SYNCOPE: Status: ACTIVE | Noted: 2018-11-02

## 2022-03-19 PROBLEM — T78.2XXA ANAPHYLAXIS: Status: ACTIVE | Noted: 2018-11-02

## 2022-11-15 ENCOUNTER — ANESTHESIA (OUTPATIENT)
Dept: ENDOSCOPY | Age: 67
End: 2022-11-15
Payer: MEDICARE

## 2022-11-15 ENCOUNTER — HOSPITAL ENCOUNTER (OUTPATIENT)
Age: 67
Setting detail: OUTPATIENT SURGERY
Discharge: HOME | End: 2022-11-15
Attending: INTERNAL MEDICINE | Admitting: INTERNAL MEDICINE
Payer: MEDICARE

## 2022-11-15 ENCOUNTER — ANESTHESIA EVENT (OUTPATIENT)
Dept: ENDOSCOPY | Age: 67
End: 2022-11-15
Payer: MEDICARE

## 2022-11-15 VITALS
HEART RATE: 70 BPM | RESPIRATION RATE: 16 BRPM | DIASTOLIC BLOOD PRESSURE: 96 MMHG | HEIGHT: 74 IN | SYSTOLIC BLOOD PRESSURE: 137 MMHG | BODY MASS INDEX: 28.62 KG/M2 | WEIGHT: 223 LBS | OXYGEN SATURATION: 98 % | TEMPERATURE: 98.9 F

## 2022-11-15 PROCEDURE — 2709999900 HC NON-CHARGEABLE SUPPLY: Performed by: INTERNAL MEDICINE

## 2022-11-15 PROCEDURE — 76060000031 HC ANESTHESIA FIRST 0.5 HR: Performed by: INTERNAL MEDICINE

## 2022-11-15 PROCEDURE — 74011000250 HC RX REV CODE- 250: Performed by: NURSE ANESTHETIST, CERTIFIED REGISTERED

## 2022-11-15 PROCEDURE — 88305 TISSUE EXAM BY PATHOLOGIST: CPT

## 2022-11-15 PROCEDURE — 76040000019: Performed by: INTERNAL MEDICINE

## 2022-11-15 PROCEDURE — 74011250636 HC RX REV CODE- 250/636: Performed by: NURSE ANESTHETIST, CERTIFIED REGISTERED

## 2022-11-15 PROCEDURE — 74011250636 HC RX REV CODE- 250/636: Performed by: INTERNAL MEDICINE

## 2022-11-15 PROCEDURE — 77030037345 HC NET FB ENDO RETVR RESCUNT DISP BSC -B: Performed by: INTERNAL MEDICINE

## 2022-11-15 PROCEDURE — 77030010936 HC CLP LIG BSC -C: Performed by: INTERNAL MEDICINE

## 2022-11-15 PROCEDURE — 77030013992 HC SNR POLYP ENDOSC BSC -B: Performed by: INTERNAL MEDICINE

## 2022-11-15 PROCEDURE — 74011250637 HC RX REV CODE- 250/637: Performed by: INTERNAL MEDICINE

## 2022-11-15 DEVICE — WORKING LENGTH 235CM, WORKING CHANNEL 2.8MM
Type: IMPLANTABLE DEVICE | Site: CECUM | Status: FUNCTIONAL
Brand: RESOLUTION 360 CLIP

## 2022-11-15 RX ORDER — DEXTROMETHORPHAN/PSEUDOEPHED 2.5-7.5/.8
1.2 DROPS ORAL
Status: DISCONTINUED | OUTPATIENT
Start: 2022-11-15 | End: 2022-11-15 | Stop reason: HOSPADM

## 2022-11-15 RX ORDER — EPINEPHRINE 0.1 MG/ML
1 INJECTION INTRACARDIAC; INTRAVENOUS
Status: DISCONTINUED | OUTPATIENT
Start: 2022-11-15 | End: 2022-11-15 | Stop reason: HOSPADM

## 2022-11-15 RX ORDER — LIDOCAINE HYDROCHLORIDE 20 MG/ML
INJECTION, SOLUTION EPIDURAL; INFILTRATION; INTRACAUDAL; PERINEURAL AS NEEDED
Status: DISCONTINUED | OUTPATIENT
Start: 2022-11-15 | End: 2022-11-15 | Stop reason: HOSPADM

## 2022-11-15 RX ORDER — MIDAZOLAM HYDROCHLORIDE 1 MG/ML
.25-5 INJECTION, SOLUTION INTRAMUSCULAR; INTRAVENOUS
Status: DISCONTINUED | OUTPATIENT
Start: 2022-11-15 | End: 2022-11-15 | Stop reason: HOSPADM

## 2022-11-15 RX ORDER — SODIUM CHLORIDE 0.9 % (FLUSH) 0.9 %
5-40 SYRINGE (ML) INJECTION AS NEEDED
Status: DISCONTINUED | OUTPATIENT
Start: 2022-11-15 | End: 2022-11-15 | Stop reason: HOSPADM

## 2022-11-15 RX ORDER — NALOXONE HYDROCHLORIDE 0.4 MG/ML
0.4 INJECTION, SOLUTION INTRAMUSCULAR; INTRAVENOUS; SUBCUTANEOUS
Status: DISCONTINUED | OUTPATIENT
Start: 2022-11-15 | End: 2022-11-15 | Stop reason: HOSPADM

## 2022-11-15 RX ORDER — FENTANYL CITRATE 50 UG/ML
25-200 INJECTION, SOLUTION INTRAMUSCULAR; INTRAVENOUS
Status: DISCONTINUED | OUTPATIENT
Start: 2022-11-15 | End: 2022-11-15 | Stop reason: HOSPADM

## 2022-11-15 RX ORDER — SODIUM CHLORIDE 9 MG/ML
50 INJECTION, SOLUTION INTRAVENOUS CONTINUOUS
Status: DISCONTINUED | OUTPATIENT
Start: 2022-11-15 | End: 2022-11-15 | Stop reason: HOSPADM

## 2022-11-15 RX ORDER — PROPOFOL 10 MG/ML
INJECTION, EMULSION INTRAVENOUS AS NEEDED
Status: DISCONTINUED | OUTPATIENT
Start: 2022-11-15 | End: 2022-11-15 | Stop reason: HOSPADM

## 2022-11-15 RX ORDER — SODIUM CHLORIDE 0.9 % (FLUSH) 0.9 %
5-40 SYRINGE (ML) INJECTION EVERY 8 HOURS
Status: DISCONTINUED | OUTPATIENT
Start: 2022-11-15 | End: 2022-11-15 | Stop reason: HOSPADM

## 2022-11-15 RX ORDER — FLUMAZENIL 0.1 MG/ML
0.2 INJECTION INTRAVENOUS
Status: DISCONTINUED | OUTPATIENT
Start: 2022-11-15 | End: 2022-11-15 | Stop reason: HOSPADM

## 2022-11-15 RX ORDER — ATROPINE SULFATE 0.1 MG/ML
0.5 INJECTION INTRAVENOUS
Status: DISCONTINUED | OUTPATIENT
Start: 2022-11-15 | End: 2022-11-15 | Stop reason: HOSPADM

## 2022-11-15 RX ADMIN — LIDOCAINE HYDROCHLORIDE 60 MG: 20 INJECTION, SOLUTION EPIDURAL; INFILTRATION; INTRACAUDAL; PERINEURAL at 08:30

## 2022-11-15 RX ADMIN — PROPOFOL 100 MG: 10 INJECTION, EMULSION INTRAVENOUS at 08:30

## 2022-11-15 RX ADMIN — PROPOFOL 50 MG: 10 INJECTION, EMULSION INTRAVENOUS at 08:39

## 2022-11-15 RX ADMIN — PROPOFOL 100 MG: 10 INJECTION, EMULSION INTRAVENOUS at 08:35

## 2022-11-15 RX ADMIN — PROPOFOL 50 MG: 10 INJECTION, EMULSION INTRAVENOUS at 08:44

## 2022-11-15 RX ADMIN — SODIUM CHLORIDE: 900 INJECTION, SOLUTION INTRAVENOUS at 08:25

## 2022-11-15 NOTE — PROCEDURES
[address]  [address]      Colonoscopy Operative Report    Jodie King  [id number]  1955      Procedure Type:   Colonoscopy with polypectomy (snare cautery)     Indications:    Personal history of colon polyps (screening only)       Pre-operative Diagnosis: see indication above    Post-operative Diagnosis:  See findings below    :  Dustin Hernandez MD    Surgical Assistant: Endoscopy Technician-1: Xavier Rausch  Endoscopy RN-1: Margarita Schilling RN    Implants:  None    Referring Provider: Panda Lara MD      Sedation:  MAC anesthesia Propofol      Procedure Details:  After informed consent was obtained with all risks and benefits of procedure explained and preoperative exam completed, the patient was taken to the endoscopy suite and placed in the left lateral decubitus position. Upon sequential sedation as per above, a digital rectal exam was performed demonstrating internal hemorrhoids. The Olympus videocolonoscope  was inserted in the rectum and carefully advanced to the cecum, which was identified by the ileocecal valve and appendiceal orifice. The cecum was identified by the ileocecal valve and appendiceal orifice. The quality of preparation was good. The colonoscope was slowly withdrawn with careful evaluation between folds. Retroflexion in the rectum was completed . Findings:   Rectum: normal  Sigmoid: normal  Descending Colon: normal  Transverse Colon: normal  Ascending Colon: normal  Cecum: 3 cm size sessile polyp removed by hot snaring in one piece, 2 hemoclips were placed at site and closed it , to prevent bleeding         Specimen Removed:   as above    Complications: None. EBL:  None. Impression:     see findings    Recommendations: --Await pathology.       Recommendation for next colonscopy in 3 years  Avoid NSAIDS for next 3 days      Signed By: Dustin Hernandez MD     11/15/2022  8:53 AM

## 2022-11-15 NOTE — DISCHARGE INSTRUCTIONS
[address]  [address] [facility]  [id number]  1955    Discomfort:  Redness at IV site- apply warm compress to area; if redness or soreness persist- contact your physician  There may be a slight amount of blood passed from the rectum  Gaseous discomfort- walking, belching will help relieve any discomfort  You may not operate a vehicle for 12 hours  You may not engage in an occupation involving machinery or appliances for rest of today  You may not drink alcoholic beverages for at least 12 hours  Avoid making any critical decisions for at least 24 hour  DIET:  You may resume your regular diet - however -  remember your colon is empty and a heavy meal will produce gas. Avoid these foods:  vegetables, fried / greasy foods, carbonated drinks     ACTIVITY:  You may  resume your normal daily activities it is recommended that you spend the remainder of the day resting -  avoid any strenuous activity. CALL M.D.   ANY SIGN OF:   Increasing pain, nausea, vomiting  Abdominal distension (swelling)  New increased bleeding (oral or rectal)  Fever (chills)  Pain in chest area  Bloody discharge from nose or mouth  Shortness of breath      Follow-up Instructions:   Call Dr. Kelsey Montana for any questions or problems at [phone]             Avoid NSAIDS for next 3 days          ENDOSCOPY FINDINGS:   Your colonoscopy showed one polyp, all removed, rest of exam was normal.  Telephone # [phone]      Signed By: Kelsey Montana MD     11/15/2022  8:57 AM       DISCHARGE SUMMARY from Nurse    The following personal items collected during your admission are returned to you:   Dental Appliance: Dental Appliances: None  Vision: Visual Aid: None  Hearing Aid:    Jewelry:    Clothing:    Other Valuables:    Valuables sent to safe:        Learning About Coronavirus (COVID-19)  Coronavirus [phone]): Overview  What is coronavirus (COVID-19)? The coronavirus disease (COVID-19) is caused by a virus. It is an illness that was first found in [place], [place], in December 2019. It has since spread worldwide. The virus can cause fever, cough, and trouble breathing. In severe cases, it can cause pneumonia and make it hard to breathe without help. It can cause death. Coronaviruses are a large group of viruses. They cause the common cold. They also cause more serious illnesses like Middle East respiratory syndrome (MERS) and severe acute respiratory syndrome (SARS). COVID-19 is caused by a novel coronavirus. That means it's a new type that has not been seen in people before. This virus spreads person-to-person through droplets from coughing and sneezing. It can also spread when you are close to someone who is infected. And it can spread when you touch something that has the virus on it, such as a doorknob or a tabletop. What can you do to protect yourself from coronavirus (COVID-19)? The best way to protect yourself from getting sick is to: Avoid areas where there is an outbreak. Avoid contact with people who may be infected. Wash your hands often with soap or alcohol-based hand sanitizers. Avoid crowds and try to stay at least 6 feet away from other people. Wash your hands often, especially after you cough or sneeze. Use soap and water, and scrub for at least 20 seconds. If soap and water aren't available, use an alcohol-based hand . Avoid touching your mouth, nose, and eyes. What can you do to avoid spreading the virus to others? To help avoid spreading the virus to others:  Cover your mouth with a tissue when you cough or sneeze. Then throw the tissue in the trash. Use a disinfectant to clean things that you touch often. Stay home if you are sick or have been exposed to the virus. Don't go to school, work, or public areas. And don't use public transportation.   If you are sick:  Leave your home only if you need to get medical care. But call the doctor's office first so they know you're coming. And wear a face mask, if you have one. If you have a face mask, wear it whenever you're around other people. It can help stop the spread of the virus when you cough or sneeze. Clean and disinfect your home every day. Use household  and disinfectant wipes or sprays. Take special care to clean things that you grab with your hands. These include doorknobs, remote controls, phones, and handles on your refrigerator and microwave. And don't forget countertops, tabletops, bathrooms, and computer keyboards. When to call for help  Call 911 anytime you think you may need emergency care. For example, call if:  You have severe trouble breathing. (You can't talk at all.)  You have constant chest pain or pressure. You are severely dizzy or lightheaded. You are confused or can't think clearly. Your face and lips have a blue color. You pass out (lose consciousness) or are very hard to wake up. Call your doctor now if you develop symptoms such as:  Shortness of breath. Fever. Cough. If you need to get care, call ahead to the doctor's office for instructions before you go. Make sure you wear a face mask, if you have one, to prevent exposing other people to the virus. Where can you get the latest information? The following health organizations are tracking and studying this virus. Their websites contain the most up-to-date information. Harmony Ferguson also learn what to do if you think you may have been exposed to the virus. U.S. Centers for Disease Control and Prevention (CDC): The CDC provides updated news about the disease and travel advice. The website also tells you how to prevent the spread of infection. www.cdc.gov  World Health Organization [facility]): WHO offers information about the virus outbreaks. WHO also has travel advice. www.who.int  Current as of: April 1, 2020               Content Version: 12.4  © 5851-9981 [employer], Incorporated.    Care instructions adapted under license by your healthcare professional. If you have questions about a medical condition or this instruction, always ask your healthcare professional. [place] [address] any warranty or liability for your use of this information.

## 2022-11-15 NOTE — ANESTHESIA POSTPROCEDURE EVALUATION
Procedure(s):  COLONOSCOPY  ENDOSCOPIC POLYPECTOMY  RESOLUTION CLIP. MAC    Anesthesia Post Evaluation      Multimodal analgesia: multimodal analgesia used between 6 hours prior to anesthesia start to PACU discharge  Patient location during evaluation: PACU  Level of consciousness: awake  Pain management: adequate  Airway patency: patent  Anesthetic complications: no  Cardiovascular status: acceptable  Respiratory status: acceptable  Hydration status: acceptable  Post anesthesia nausea and vomiting:  none  Final Post Anesthesia Temperature Assessment:  Normothermia (36.0-37.5 degrees C)      INITIAL Post-op Vital signs: No vitals data found for the desired time range.

## 2022-11-15 NOTE — ANESTHESIA PREPROCEDURE EVALUATION
Relevant Problems   No relevant active problems       Anesthetic History   No history of anesthetic complications            Review of Systems / Medical History  Patient summary reviewed, nursing notes reviewed and pertinent labs reviewed    Pulmonary  Within defined limits                 Neuro/Psych   Within defined limits           Cardiovascular  Within defined limits                Exercise tolerance: >4 METS     GI/Hepatic/Renal  Within defined limits              Endo/Other  Within defined limits           Other Findings              Physical Exam    Airway  Mallampati: II  TM Distance: 4 - 6 cm  Neck ROM: normal range of motion   Mouth opening: Normal     Cardiovascular    Rhythm: regular  Rate: normal         Dental  No notable dental hx       Pulmonary  Breath sounds clear to auscultation               Abdominal  GI exam deferred       Other Findings            Anesthetic Plan    ASA: 2  Anesthesia type: MAC          Induction: Intravenous  Anesthetic plan and risks discussed with: Patient

## 2022-11-15 NOTE — H&P
[address] [place] [address] [address]      History and Physical       NAME:  Mike Girard   :   1955   MRN:   [id number]             History of Present Illness:  Patient is a 79 y.o. who is seen for h/o colon polyps . PMH:  Past Medical History:   Diagnosis Date    Contact dermatitis and other eczema, due to unspecified cause 2018    Lonestar tick       PSH:  Past Surgical History:   Procedure Laterality Date    COLONOSCOPY N/A 07/15/2019    COLONOSCOPY performed by Ijeoma Moses MD at [facility] ENDOSCOPY    [address]      left hip replacement       Allergies: Allergies   Allergen Reactions    Other Food Anaphylaxis and Hives     Dairy and lamb allergy d/t alpha gal.    Beef Containing Products Anaphylaxis and Hives     Grass feed beef or hops. Has alpha gal. Gelatin in capsules allergy. Pork, lamb allergy. Can get fish and poultry. No dairy. Gelatin Anaphylaxis and Hives     Alpha gal allergy. Includes allergy to gelatin in capsules. Pork Derived (Porcine) Anaphylaxis and Hives     Alpha gal allergy. Hops Other (comments)     Palms itch.        Home Medications:  None       Hospital Medications:  Current Facility-Administered Medications   Medication Dose Route Frequency    0.9% sodium chloride infusion  50 mL/hr IntraVENous CONTINUOUS    sodium chloride (NS) flush 5-40 mL  5-40 mL IntraVENous Q8H    sodium chloride (NS) flush 5-40 mL  5-40 mL IntraVENous PRN    midazolam (VERSED) injection 0.25-5 mg  0.25-5 mg IntraVENous Multiple    fentaNYL citrate (PF) injection  mcg   mcg IntraVENous Multiple    naloxone (NARCAN) injection 0.4 mg  0.4 mg IntraVENous Multiple    flumazeniL (ROMAZICON) 0.1 mg/mL injection 0.2 mg  0.2 mg IntraVENous Multiple    simethicone (MYLICON) 58LL/6.0WJ oral drops 80 mg  1.2 mL Oral Multiple    atropine injection 0.5 mg  0.5 mg IntraVENous ONCE PRN    EPINEPHrine (ADRENALIN) 0.1 mg/mL syringe 1 mg  1 mg Endoscopically ONCE PRN       Social History:  Social History     Tobacco Use    Smoking status: Never    Smokeless tobacco: Former    Tobacco comments:     Smokeless tobacco as a teenager   Substance Use Topics    Alcohol use: Yes     Comment: very occasional       Family History:  Family History   Problem Relation Age of Onset    Hypertension Mother     Psychiatric Disorder Father         dementia    Hypertension Sister          The patient was counseled at length about the risks of tulio Covid-19 in the micheal-operative and post-operative states including the recovery window of their procedure. The patient was made aware that tulio Covid-19 after a surgical procedure may worsen their prognosis for recovering from the virus and lend to a higher morbidity and or mortality risk. The patient was given the options of postponing their procedure. All of the risks, benefits, and alternatives were discussed. The patient does  wish to proceed with the procedure. Review of Systems:      Constitutional: negative fever, negative chills, negative weight loss  Eyes:   negative visual changes  ENT:   negative sore throat, tongue or lip swelling  Respiratory:  negative cough, negative dyspnea  Cards:  negative for chest pain, palpitations, lower extremity edema  GI:   See HPI  :  negative for frequency, dysuria  Integument:  negative for rash and pruritus  Heme:  negative for easy bruising and gum/nose bleeding  Musculoskel: negative for myalgias,  back pain and muscle weakness  Neuro: negative for headaches, dizziness, vertigo  Psych:  negative for feelings of anxiety, depression       Objective:   Patient Vitals for the past 8 hrs:   BP Temp Pulse Resp SpO2 Height Weight   11/15/22 0802 (!) 163/92 98 °F (36.7 °C) 73 16 96 % 6' 2\" (1.88 m) 101.2 kg (223 lb)     No intake/output data recorded. No intake/output data recorded.     EXAM:     NEURO-a&o   HEENT-wnl   LUNGS-clear    COR-regular rate and rhythym ABD-soft , no tenderness, no rebound, good bs     EXT-no edema     Data Review     No results for input(s): WBC, HGB, HCT, PLT, HGBEXT, HCTEXT, PLTEXT in the last 72 hours. No results for input(s): NA, K, CL, CO2, BUN, CREA, GLU, PHOS, CA in the last 72 hours. No results for input(s): AP, TBIL, TP, ALB, GLOB, GGT, AML, LPSE in the last 72 hours. No lab exists for component: SGOT, GPT, AMYP, HLPSE  No results for input(s): INR, PTP, APTT, INREXT in the last 72 hours. Assessment:     H/o colon polyps      Patient Active Problem List   Diagnosis Code    Bradycardia R00.1    Syncope R55    Allergic reaction T78.40XA    Lactic acid acidosis E87.20    Anaphylaxis T78. 2XXA    Allergy to alpha-gal Z91.018         Plan:     Endoscopic procedure with sedation     Signed By: Gen Weinberg MD     11/15/2022  8:28 AM

## 2023-03-29 ENCOUNTER — LAB ONLY (OUTPATIENT)
Dept: FAMILY MEDICINE CLINIC | Age: 68
End: 2023-03-29
Payer: MEDICARE

## 2023-03-29 ENCOUNTER — OFFICE VISIT (OUTPATIENT)
Dept: FAMILY MEDICINE CLINIC | Age: 68
End: 2023-03-29
Payer: MEDICARE

## 2023-03-29 VITALS
HEIGHT: 74 IN | RESPIRATION RATE: 18 BRPM | WEIGHT: 233 LBS | HEART RATE: 65 BPM | SYSTOLIC BLOOD PRESSURE: 152 MMHG | BODY MASS INDEX: 29.9 KG/M2 | OXYGEN SATURATION: 95 % | DIASTOLIC BLOOD PRESSURE: 94 MMHG | TEMPERATURE: 98.1 F

## 2023-03-29 DIAGNOSIS — R03.0 ELEVATED BP WITHOUT DIAGNOSIS OF HYPERTENSION: ICD-10-CM

## 2023-03-29 DIAGNOSIS — Z13.6 SCREENING FOR CARDIOVASCULAR CONDITION: ICD-10-CM

## 2023-03-29 DIAGNOSIS — Z12.5 SPECIAL SCREENING FOR MALIGNANT NEOPLASM OF PROSTATE: ICD-10-CM

## 2023-03-29 DIAGNOSIS — Z00.00 MEDICARE ANNUAL WELLNESS VISIT, SUBSEQUENT: Primary | ICD-10-CM

## 2023-03-29 DIAGNOSIS — Z91.018 ALLERGY TO ALPHA-GAL: ICD-10-CM

## 2023-03-29 PROCEDURE — 3017F COLORECTAL CA SCREEN DOC REV: CPT | Performed by: FAMILY MEDICINE

## 2023-03-29 PROCEDURE — G0439 PPPS, SUBSEQ VISIT: HCPCS | Performed by: FAMILY MEDICINE

## 2023-03-29 PROCEDURE — G0463 HOSPITAL OUTPT CLINIC VISIT: HCPCS | Performed by: FAMILY MEDICINE

## 2023-03-29 PROCEDURE — 99213 OFFICE O/P EST LOW 20 MIN: CPT | Performed by: FAMILY MEDICINE

## 2023-03-29 PROCEDURE — G8510 SCR DEP NEG, NO PLAN REQD: HCPCS | Performed by: FAMILY MEDICINE

## 2023-03-29 PROCEDURE — G8427 DOCREV CUR MEDS BY ELIG CLIN: HCPCS | Performed by: FAMILY MEDICINE

## 2023-03-29 PROCEDURE — G8417 CALC BMI ABV UP PARAM F/U: HCPCS | Performed by: FAMILY MEDICINE

## 2023-03-29 PROCEDURE — G8536 NO DOC ELDER MAL SCRN: HCPCS | Performed by: FAMILY MEDICINE

## 2023-03-29 PROCEDURE — 1123F ACP DISCUSS/DSCN MKR DOCD: CPT | Performed by: FAMILY MEDICINE

## 2023-03-29 PROCEDURE — 1101F PT FALLS ASSESS-DOCD LE1/YR: CPT | Performed by: FAMILY MEDICINE

## 2023-03-29 RX ORDER — EPINEPHRINE 0.3 MG/.3ML
INJECTION SUBCUTANEOUS
COMMUNITY
Start: 2022-06-23

## 2023-03-29 NOTE — PROGRESS NOTES
This is the Subsequent Medicare Annual Wellness Exam, performed 12 months or more after the Initial AWV or the last Subsequent AWV    I have reviewed the patient's medical history in detail and updated the computerized patient record. Assessment/Plan   Education and counseling provided:  Are appropriate based on today's review and evaluation  End-of-Life planning (with patient's consent)  Prostate cancer screening tests (PSA, covered annually)  Colorectal cancer screening tests  Cardiovascular screening blood test  Screening for glaucoma    1. Medicare annual wellness visit, subsequent  2. Special screening for malignant neoplasm of prostate  Comments:  Discussed the potential benefits and harms of the prostate-specific antigen (PSA) serum level test as a screening tool for early prostate cancer detection. Depression Risk Factor Screening     3 most recent PHQ Screens 3/23/2023   Little interest or pleasure in doing things Not at all   Feeling down, depressed, irritable, or hopeless Not at all   Total Score PHQ 2 0       Alcohol & Drug Abuse Risk Screen   Do you average more than 1 drink per night or more than 7 drinks a week?: (P) No  In the past three months have you had more than 4 drinks containing alcohol on one occasion?: (P) No            Functional Ability and Level of Safety   Hearing:  Hearing: (P) Patient reports hearing is good      Activities of Daily Living: The home contains: (P) no safety equipment  Functional ADLs: (P) Patient does total self care     Ambulation:  Patient ambulates: (P) with no difficulty     Fall Risk:  Fall Risk Assessment, last 12 mths 11/8/2019   Able to walk? Yes   Fall in past 12 months?  No     Abuse Screen:  Do you ever feel afraid of your partner?: (P) No  Are you in a relationship with someone who physically or mentally threatens you?: (P) No  Is it safe for you to go home?: (P) Yes        Cognitive Screening   Has your family/caregiver stated any concerns about your memory?: (P) No         Health Maintenance Due     Health Maintenance Due   Topic Date Due    Shingles Vaccine (1 of 2) Never done    Pneumococcal 65+ years (1 - PCV) Never done    COVID-19 Vaccine (3 - Booster for Pfizer series) 04/15/2021    Medicare Yearly Exam  Never done       Patient Care Team   Patient Care Team:  Ricky Habermann, MD as PCP - General (Family Medicine)  Ricky Habermann, MD as PCP - [facility] Empaneled Provider    History     Patient Active Problem List   Diagnosis Code    Bradycardia R00.1    Syncope R55    Allergic reaction T78.40XA    Lactic acid acidosis E87.20    Anaphylaxis T78. 2XXA    Allergy to alpha-gal Z91.018     Past Medical History:   Diagnosis Date    Contact dermatitis and other eczema, due to unspecified cause 2018    Lonestar tick      Past Surgical History:   Procedure Laterality Date    COLONOSCOPY N/A 07/15/2019    COLONOSCOPY performed by Sina Wesley MD at [facility] ENDOSCOPY    COLONOSCOPY N/A 11/15/2022    COLONOSCOPY performed by Sina Wesley MD at [facility] ENDOSCOPY    [address]      left hip replacement       Allergies   Allergen Reactions    Other Food Anaphylaxis and Hives     Dairy and lamb allergy d/t alpha gal.    Beef Containing Products Anaphylaxis and Hives     Grass feed beef or hops. Has alpha gal. Gelatin in capsules allergy. Pork, lamb allergy. Can get fish and poultry. No dairy. Gelatin Anaphylaxis and Hives     Alpha gal allergy. Includes allergy to gelatin in capsules. Pork Derived (Porcine) Anaphylaxis and Hives     Alpha gal allergy. Hops Other (comments)     Palms itch.        Family History   Problem Relation Age of Onset    Hypertension Mother     Psychiatric Disorder Father         dementia    Hypertension Sister      Social History     Tobacco Use    Smoking status: Never    Smokeless tobacco: Former    Tobacco comments:     Smokeless tobacco as a teenager   Substance Use Topics    Alcohol use: Yes     Comment: very occasional         Jim Diamond MD

## 2023-03-29 NOTE — PROGRESS NOTES
Identified pt with two pt identifiers(name and ). Chief Complaint   Patient presents with    Annual Wellness Visit     No  concerns        Health Maintenance Due   Topic    Shingles Vaccine (1 of 2)    Pneumococcal 65+ years (1 - PCV)    COVID-19 Vaccine (3 - Booster for Pfizer series)    Medicare Yearly Exam        Wt Readings from Last 3 Encounters:   23 233 lb (105.7 kg)   11/15/22 223 lb (101.2 kg)   19 239 lb 3.2 oz (108.5 kg)     Temp Readings from Last 3 Encounters:   23 98.1 °F (36.7 °C) (Temporal)   11/15/22 98.9 °F (37.2 °C)   19 97.6 °F (36.4 °C) (Oral)     BP Readings from Last 3 Encounters:   23 (!) 142/90   11/15/22 (!) 137/96   19 120/80     Pulse Readings from Last 3 Encounters:   23 65   11/15/22 70   19 (!) 57         Learning Assessment:  :     Learning Assessment 2016   PRIMARY LEARNER Patient   HIGHEST LEVEL OF EDUCATION - PRIMARY LEARNER  2 YEARS OF COLLEGE   BARRIERS PRIMARY LEARNER NONE   CO-LEARNER CAREGIVER No   PRIMARY LANGUAGE ENGLISH   LEARNER PREFERENCE PRIMARY OTHER (COMMENT)   ANSWERED BY self   RELATIONSHIP SELF       Depression Screening:  :     3 most recent PHQ Screens 3/29/2023   Little interest or pleasure in doing things Not at all   Feeling down, depressed, irritable, or hopeless Not at all   Total Score PHQ 2 0       Fall Risk Assessment:  :     Fall Risk Assessment, last 12 mths 3/29/2023   Able to walk? Yes   Fall in past 12 months? 0   Do you feel unsteady? 0   Are you worried about falling 0       Abuse Screening:  :     Abuse Screening Questionnaire 3/23/2023 2019 2018 2016   Do you ever feel afraid of your partner? N N N N   Are you in a relationship with someone who physically or mentally threatens you? N N N N   Is it safe for you to go home? Rickey Pearson       Coordination of Care Questionnaire:  :     1) Have you been to an emergency room, urgent care clinic since your last visit?  yes Hip replacement 07/30/2022 at [address] since your last visit? yes             2) Have you seen or consulted any other health care providers outside of [address] [place] [address] since your last visit? no  (Include any pap smears or colon screenings in this section.)    3) Do you have an Advance Directive on file? no  Are you interested in receiving information about Advance Directives? no    Patient is accompanied by self I have received verbal consent from Marcelino Moss to discuss any/all medical information while they are present in the room. 4.  For patients aged 39-70: Has the patient had a colonoscopy / FIT/ Cologuard? yes      If the patient is female:    5. For patients aged 41-77: Has the patient had a mammogram within the past 2 years? NA - based on age or sex      10. For patients aged 21-65: Has the patient had a pap smear?  NA - based on age or sex

## 2023-03-30 PROBLEM — T78.2XXA ANAPHYLAXIS: Status: RESOLVED | Noted: 2018-11-02 | Resolved: 2023-03-30

## 2023-03-30 PROBLEM — R55 SYNCOPE: Status: RESOLVED | Noted: 2018-11-02 | Resolved: 2023-03-30

## 2023-03-30 PROBLEM — E87.20 LACTIC ACID ACIDOSIS: Status: RESOLVED | Noted: 2018-11-02 | Resolved: 2023-03-30

## 2023-03-30 PROBLEM — R00.1 BRADYCARDIA: Status: RESOLVED | Noted: 2018-11-02 | Resolved: 2023-03-30

## 2023-03-30 PROBLEM — T78.40XA ALLERGIC REACTION: Status: RESOLVED | Noted: 2018-11-02 | Resolved: 2023-03-30

## 2023-03-30 LAB — PSA SERPL-MCNC: 3.3 NG/ML (ref 0.01–4)

## 2023-03-30 NOTE — PROGRESS NOTES
HISTORY OF PRESENT ILLNESS  Orly Baig is a 79 y.o. male. HPI  Pt noted to have elevated BP today. No hx HTN. He eats healthy and exercises regularly. + fam hx HTN. Feels fine. Pt had last labs done at [facility] Dr Fidencio Trinidad in March 2022. No episodes of hives. Patient Active Problem List   Diagnosis Code    Allergy to alpha-gal Z91.018     Current Outpatient Medications   Medication Sig Dispense Refill    EPINEPHrine (EPIPEN) 0.3 mg/0.3 mL injection See admin instructions for allergic reaction (Patient not taking: Reported on 3/29/2023)         Review of Systems   All other systems reviewed and are negative. Visit Vitals  BP (!) 152/94 (BP 1 Location: Left upper arm)   Pulse 65   Temp 98.1 °F (36.7 °C) (Temporal)   Resp 18   Ht 6' 2\" (1.88 m)   Wt 233 lb (105.7 kg)   SpO2 95%   BMI 29.92 kg/m²       Physical Exam  Vitals reviewed. Constitutional:       Appearance: Normal appearance. Cardiovascular:      Rate and Rhythm: Normal rate and regular rhythm. Heart sounds: Normal heart sounds. Pulmonary:      Effort: Pulmonary effort is normal.      Breath sounds: Normal breath sounds. Neurological:      General: No focal deficit present. Mental Status: He is alert. ASSESSMENT and PLAN    ICD-10-CM ICD-9-CM    1. Medicare annual wellness visit, subsequent  Z00.00 V70.0       2. Special screening for malignant neoplasm of prostate  Z12.5 V76.44 [place] PROSTATE CA SCREENING; ROBBIE      PSA SCREENING (SCREENING)    Discussed the potential benefits and harms of the prostate-specific antigen (PSA) serum level test as a screening tool for early prostate cancer detection. 3. Screening for cardiovascular condition  Z13.6 V81.2 SARS-COV-2 AB, IGG      4. Elevated BP without diagnosis of hypertension  R03.0 796.2       5. Allergy to alpha-gal  Z91.018 V15.05         Monitor BP. FU in 2-3 weeks. Bring home BP machine.  If BP remains elevated, order new labs: CMP, TSH  Low sodium diet.  Request labs form 2022 from [facility].

## 2023-03-31 LAB
SARS-COV-2 SEMI-QUANT IGG AB: 623 AU/ML
SARS-COV-2 SPIKE AB, INTERP - CVSQ1M: POSITIVE

## 2024-01-25 LAB — HBA1C MFR BLD HPLC: 4.8 %

## 2024-04-28 SDOH — ECONOMIC STABILITY: FOOD INSECURITY: WITHIN THE PAST 12 MONTHS, YOU WORRIED THAT YOUR FOOD WOULD RUN OUT BEFORE YOU GOT MONEY TO BUY MORE.: NEVER TRUE

## 2024-04-28 SDOH — ECONOMIC STABILITY: INCOME INSECURITY: HOW HARD IS IT FOR YOU TO PAY FOR THE VERY BASICS LIKE FOOD, HOUSING, MEDICAL CARE, AND HEATING?: NOT HARD AT ALL

## 2024-04-28 SDOH — ECONOMIC STABILITY: HOUSING INSECURITY
IN THE LAST 12 MONTHS, WAS THERE A TIME WHEN YOU DID NOT HAVE A STEADY PLACE TO SLEEP OR SLEPT IN A SHELTER (INCLUDING NOW)?: NO

## 2024-04-28 SDOH — ECONOMIC STABILITY: FOOD INSECURITY: WITHIN THE PAST 12 MONTHS, THE FOOD YOU BOUGHT JUST DIDN'T LAST AND YOU DIDN'T HAVE MONEY TO GET MORE.: NEVER TRUE

## 2024-04-28 SDOH — ECONOMIC STABILITY: TRANSPORTATION INSECURITY
IN THE PAST 12 MONTHS, HAS LACK OF TRANSPORTATION KEPT YOU FROM MEETINGS, WORK, OR FROM GETTING THINGS NEEDED FOR DAILY LIVING?: NO

## 2024-04-28 SDOH — HEALTH STABILITY: PHYSICAL HEALTH: ON AVERAGE, HOW MANY DAYS PER WEEK DO YOU ENGAGE IN MODERATE TO STRENUOUS EXERCISE (LIKE A BRISK WALK)?: 5 DAYS

## 2024-04-28 SDOH — HEALTH STABILITY: PHYSICAL HEALTH: ON AVERAGE, HOW MANY MINUTES DO YOU ENGAGE IN EXERCISE AT THIS LEVEL?: 90 MIN

## 2024-04-28 ASSESSMENT — PATIENT HEALTH QUESTIONNAIRE - PHQ9
SUM OF ALL RESPONSES TO PHQ QUESTIONS 1-9: 0
SUM OF ALL RESPONSES TO PHQ QUESTIONS 1-9: 0
2. FEELING DOWN, DEPRESSED OR HOPELESS: NOT AT ALL
SUM OF ALL RESPONSES TO PHQ9 QUESTIONS 1 & 2: 0
SUM OF ALL RESPONSES TO PHQ QUESTIONS 1-9: 0
SUM OF ALL RESPONSES TO PHQ QUESTIONS 1-9: 0
1. LITTLE INTEREST OR PLEASURE IN DOING THINGS: NOT AT ALL

## 2024-04-28 ASSESSMENT — LIFESTYLE VARIABLES
HOW MANY STANDARD DRINKS CONTAINING ALCOHOL DO YOU HAVE ON A TYPICAL DAY: 0
HOW OFTEN DO YOU HAVE A DRINK CONTAINING ALCOHOL: NEVER
HOW MANY STANDARD DRINKS CONTAINING ALCOHOL DO YOU HAVE ON A TYPICAL DAY: PATIENT DOES NOT DRINK
HOW OFTEN DO YOU HAVE SIX OR MORE DRINKS ON ONE OCCASION: 1
HOW OFTEN DO YOU HAVE A DRINK CONTAINING ALCOHOL: 1

## 2024-04-30 NOTE — PROGRESS NOTES
Medicare Annual Wellness Visit    Sagar Lopez is here for Medicare AWV (No concerns) and Lab Collection (Fasting)    Assessment & Plan   Medicare annual wellness visit, subsequent  Need for prophylactic vaccination against Streptococcus pneumoniae (pneumococcus)  Screening for prostate cancer  -     PSA Screening; Future  Encounter for long-term (current) use of medications  Hives  -     Comprehensive Metabolic Panel; Future  -     CBC with Auto Differential; Future  Pure hypertriglyceridemia  -     Lipid Panel; Future  Benign prostatic hyperplasia with urinary frequency     Recommendations for Preventive Services Due: see orders and patient instructions/AVS.  Recommended screening schedule for the next 5-10 years is provided to the patient in written form: see Patient Instructions/AVS.     No follow-ups on file.     Subjective   The following acute and/or chronic problems were also addressed today:  He has alpha gal allergy. He is very careful about diet. No hive outbreaks.  Seen by Urology for BPH. Put on Uroxatral.  He had hip replacement in Feb 2024. Recovered well.    Patient's complete Health Risk Assessment and screening values have been reviewed and are found in Flowsheets. The following problems were reviewed today and where indicated follow up appointments were made and/or referrals ordered.    No Positive Risk Factors identified today.                                  Objective   Vitals:    05/01/24 0831   BP: 138/84   Site: Left Upper Arm   Position: Sitting   Cuff Size: Large Adult   Pulse: 72   Resp: 20   Temp: 98.8 °F (37.1 °C)   TempSrc: Temporal   SpO2: 98%   Weight: 105.2 kg (232 lb)   Height: 1.88 m (6' 2\")      Body mass index is 29.79 kg/m².        General Appearance: alert and oriented to person, place and time, well-developed and well-nourished, in no acute distress  Head: normocephalic and atraumatic  ENT: tympanic membrane, external ear and ear canal normal bilaterally, oropharynx

## 2024-05-01 ENCOUNTER — NURSE ONLY (OUTPATIENT)
Age: 69
End: 2024-05-01
Payer: MEDICARE

## 2024-05-01 ENCOUNTER — OFFICE VISIT (OUTPATIENT)
Age: 69
End: 2024-05-01
Payer: MEDICARE

## 2024-05-01 VITALS
TEMPERATURE: 98.8 F | HEIGHT: 74 IN | WEIGHT: 232 LBS | HEART RATE: 72 BPM | OXYGEN SATURATION: 98 % | SYSTOLIC BLOOD PRESSURE: 138 MMHG | DIASTOLIC BLOOD PRESSURE: 84 MMHG | RESPIRATION RATE: 20 BRPM | BODY MASS INDEX: 29.77 KG/M2

## 2024-05-01 DIAGNOSIS — R35.0 BENIGN PROSTATIC HYPERPLASIA WITH URINARY FREQUENCY: ICD-10-CM

## 2024-05-01 DIAGNOSIS — Z23 NEED FOR PROPHYLACTIC VACCINATION AGAINST STREPTOCOCCUS PNEUMONIAE (PNEUMOCOCCUS): ICD-10-CM

## 2024-05-01 DIAGNOSIS — E78.1 PURE HYPERTRIGLYCERIDEMIA: ICD-10-CM

## 2024-05-01 DIAGNOSIS — Z79.899 ENCOUNTER FOR LONG-TERM (CURRENT) USE OF MEDICATIONS: ICD-10-CM

## 2024-05-01 DIAGNOSIS — L50.9 HIVES: ICD-10-CM

## 2024-05-01 DIAGNOSIS — Z12.5 SCREENING FOR PROSTATE CANCER: ICD-10-CM

## 2024-05-01 DIAGNOSIS — Z00.00 MEDICARE ANNUAL WELLNESS VISIT, SUBSEQUENT: Primary | ICD-10-CM

## 2024-05-01 DIAGNOSIS — N40.1 BENIGN PROSTATIC HYPERPLASIA WITH URINARY FREQUENCY: ICD-10-CM

## 2024-05-01 PROCEDURE — G8427 DOCREV CUR MEDS BY ELIG CLIN: HCPCS | Performed by: FAMILY MEDICINE

## 2024-05-01 PROCEDURE — 99214 OFFICE O/P EST MOD 30 MIN: CPT | Performed by: FAMILY MEDICINE

## 2024-05-01 PROCEDURE — 1123F ACP DISCUSS/DSCN MKR DOCD: CPT | Performed by: FAMILY MEDICINE

## 2024-05-01 PROCEDURE — 3017F COLORECTAL CA SCREEN DOC REV: CPT | Performed by: FAMILY MEDICINE

## 2024-05-01 PROCEDURE — 1036F TOBACCO NON-USER: CPT | Performed by: FAMILY MEDICINE

## 2024-05-01 PROCEDURE — G8419 CALC BMI OUT NRM PARAM NOF/U: HCPCS | Performed by: FAMILY MEDICINE

## 2024-05-01 PROCEDURE — G0439 PPPS, SUBSEQ VISIT: HCPCS | Performed by: FAMILY MEDICINE

## 2024-05-01 RX ORDER — ALFUZOSIN HYDROCHLORIDE 10 MG/1
10 TABLET, EXTENDED RELEASE ORAL DAILY
COMMUNITY
Start: 2024-04-30

## 2024-05-01 RX ORDER — EPINEPHRINE 0.3 MG/.3ML
0.3 INJECTION SUBCUTANEOUS ONCE
COMMUNITY
Start: 2022-06-23

## 2024-05-01 NOTE — PROGRESS NOTES
Identified pt with two pt identifiers(name and ).    Chief Complaint   Patient presents with    Medicare AWV     No concerns    Lab Collection     Fasting        Health Maintenance Due   Topic    DTaP/Tdap/Td vaccine (1 - Tdap)    Shingles vaccine (1 of 2)    Respiratory Syncytial Virus (RSV) Pregnant or age 60 yrs+ (1 - 1-dose 60+ series)    Pneumococcal 65+ years Vaccine (1 of 1 - PCV)    COVID-19 Vaccine ( -  season)       Wt Readings from Last 3 Encounters:   24 105.2 kg (232 lb)   23 104.8 kg (231 lb)   23 105.7 kg (233 lb)     Temp Readings from Last 3 Encounters:   24 98.8 °F (37.1 °C) (Temporal)     BP Readings from Last 3 Encounters:   24 138/84   23 130/80   23 (!) 152/94     Pulse Readings from Last 3 Encounters:   24 72   23 79   23 65           Depression Screening:  :         2024    10:55 AM 2023     1:00 PM 3/29/2023     1:08 PM 3/23/2023     9:06 AM   PHQ-9 Questionaire   Little interest or pleasure in doing things 0 0 0 0   Feeling down, depressed, or hopeless 0 0 0 0   PHQ-9 Total Score 0 0 0 0        Fall Risk Assessment:  :         2024    10:55 AM   Fall Risk   2 or more falls in past year? no   Fall with injury in past year? no        Abuse Screening:  :          No data to display                 Coordination of Care Questionnaire:  :     \"Have you been to the ER, urgent care clinic since your last visit?  Hospitalized since your last visit?\"    NO  Right Hip replacement surgery in February with [facility] [place] [facility]   “Have you seen or consulted any other health care providers outside of [facility] since your last visit?”    NO

## 2024-05-02 LAB
ALBUMIN SERPL-MCNC: 3.8 G/DL (ref 3.5–5)
ALBUMIN/GLOB SERPL: 1.2 (ref 1.1–2.2)
ALP SERPL-CCNC: 111 U/L (ref 45–117)
ALT SERPL-CCNC: 17 U/L (ref 12–78)
ANION GAP SERPL CALC-SCNC: 4 MMOL/L (ref 5–15)
AST SERPL-CCNC: 11 U/L (ref 15–37)
BASOPHILS # BLD: 0 K/UL (ref 0–0.1)
BASOPHILS NFR BLD: 1 % (ref 0–1)
BILIRUB SERPL-MCNC: 0.3 MG/DL (ref 0.2–1)
BUN SERPL-MCNC: 13 MG/DL (ref 6–20)
BUN/CREAT SERPL: 15 (ref 12–20)
CALCIUM SERPL-MCNC: 9.5 MG/DL (ref 8.5–10.1)
CHLORIDE SERPL-SCNC: 108 MMOL/L (ref 97–108)
CHOLEST SERPL-MCNC: 160 MG/DL
CO2 SERPL-SCNC: 29 MMOL/L (ref 21–32)
CREAT SERPL-MCNC: 0.87 MG/DL (ref 0.7–1.3)
DIFFERENTIAL METHOD BLD: NORMAL
EOSINOPHIL # BLD: 0.1 K/UL (ref 0–0.4)
EOSINOPHIL NFR BLD: 2 % (ref 0–7)
ERYTHROCYTE [DISTWIDTH] IN BLOOD BY AUTOMATED COUNT: 12.4 % (ref 11.5–14.5)
GLOBULIN SER CALC-MCNC: 3.1 G/DL (ref 2–4)
GLUCOSE SERPL-MCNC: 93 MG/DL (ref 65–100)
HCT VFR BLD AUTO: 45 % (ref 36.6–50.3)
HDLC SERPL-MCNC: 44 MG/DL
HDLC SERPL: 3.6 (ref 0–5)
HGB BLD-MCNC: 14.3 G/DL (ref 12.1–17)
IMM GRANULOCYTES # BLD AUTO: 0 K/UL (ref 0–0.04)
IMM GRANULOCYTES NFR BLD AUTO: 0 % (ref 0–0.5)
LDLC SERPL CALC-MCNC: 91.8 MG/DL (ref 0–100)
LYMPHOCYTES # BLD: 1 K/UL (ref 0.8–3.5)
LYMPHOCYTES NFR BLD: 24 % (ref 12–49)
MCH RBC QN AUTO: 28.7 PG (ref 26–34)
MCHC RBC AUTO-ENTMCNC: 31.8 G/DL (ref 30–36.5)
MCV RBC AUTO: 90.2 FL (ref 80–99)
MONOCYTES # BLD: 0.4 K/UL (ref 0–1)
MONOCYTES NFR BLD: 9 % (ref 5–13)
NEUTS SEG # BLD: 2.6 K/UL (ref 1.8–8)
NEUTS SEG NFR BLD: 64 % (ref 32–75)
NRBC # BLD: 0 K/UL (ref 0–0.01)
NRBC BLD-RTO: 0 PER 100 WBC
PLATELET # BLD AUTO: 159 K/UL (ref 150–400)
PMV BLD AUTO: 12.4 FL (ref 8.9–12.9)
POTASSIUM SERPL-SCNC: 4.6 MMOL/L (ref 3.5–5.1)
PROT SERPL-MCNC: 6.9 G/DL (ref 6.4–8.2)
PSA SERPL-MCNC: 2.9 NG/ML (ref 0.01–4)
RBC # BLD AUTO: 4.99 M/UL (ref 4.1–5.7)
SODIUM SERPL-SCNC: 141 MMOL/L (ref 136–145)
TRIGL SERPL-MCNC: 121 MG/DL
VLDLC SERPL CALC-MCNC: 24.2 MG/DL
WBC # BLD AUTO: 4.1 K/UL (ref 4.1–11.1)

## 2024-05-06 ENCOUNTER — TELEPHONE (OUTPATIENT)
Age: 69
End: 2024-05-06

## 2024-05-06 NOTE — TELEPHONE ENCOUNTER
----- Message from Nery Browne MD sent at 5/5/2024  6:57 PM EDT -----  All lab results look good.  Excellent cholesterol numbers.  Normal blood cell counts.  Normal blood sugar, kidney, and liver function.  Normal prostate blood test.

## 2025-02-05 ENCOUNTER — TELEPHONE (OUTPATIENT)
Facility: CLINIC | Age: 70
End: 2025-02-05

## 2025-02-05 NOTE — PROGRESS NOTES
Patient's wife contacted me on the on-call service at 6:45 PM due to 24 hours of acute upper respiratory symptoms with cough congestion, fever to 101.6 °F and otherwise breathing well and mentating appropriately. Patient's wife swabbed him a few minutes ago and it came back positive for COVID-19 infection. They are calling in to request antiviral medication. Patient meets criteria for Paxlovid based on age with standard dosing. Discussed side effects and recommended holding off on alfuzosin during the next couple days. Okay to take alfuzosin if needed but preferably avoid due to cross-reactivity. Also gave standard precautions and conservative care recommendations for probable respiratory infection as well as ER precautions.

## 2025-02-14 ENCOUNTER — OFFICE VISIT (OUTPATIENT)
Age: 70
End: 2025-02-14

## 2025-02-14 ENCOUNTER — TELEPHONE (OUTPATIENT)
Age: 70
End: 2025-02-14

## 2025-02-14 VITALS
SYSTOLIC BLOOD PRESSURE: 115 MMHG | OXYGEN SATURATION: 97 % | BODY MASS INDEX: 29.9 KG/M2 | TEMPERATURE: 99.1 F | DIASTOLIC BLOOD PRESSURE: 78 MMHG | HEART RATE: 84 BPM | WEIGHT: 233 LBS | HEIGHT: 74 IN | RESPIRATION RATE: 14 BRPM

## 2025-02-14 DIAGNOSIS — R68.89 FLU-LIKE SYMPTOMS: Primary | ICD-10-CM

## 2025-02-14 DIAGNOSIS — R09.81 NASAL CONGESTION: ICD-10-CM

## 2025-02-14 DIAGNOSIS — R05.1 ACUTE COUGH: ICD-10-CM

## 2025-02-14 LAB
INFLUENZA A ANTIGEN, POC: NEGATIVE
INFLUENZA B ANTIGEN, POC: NEGATIVE
Lab: ABNORMAL
PERFORMING INSTRUMENT: ABNORMAL
QC PASS/FAIL: ABNORMAL
SARS-COV-2, POC: DETECTED

## 2025-02-14 RX ORDER — AZITHROMYCIN 250 MG/1
TABLET, FILM COATED ORAL
Qty: 6 TABLET | Refills: 0 | Status: SHIPPED | OUTPATIENT
Start: 2025-02-14 | End: 2025-02-24

## 2025-02-14 RX ORDER — OSELTAMIVIR PHOSPHATE 75 MG/1
75 CAPSULE ORAL 2 TIMES DAILY
Qty: 10 CAPSULE | Refills: 0 | Status: SHIPPED | OUTPATIENT
Start: 2025-02-14 | End: 2025-02-19

## 2025-02-14 RX ORDER — ALPRAZOLAM 0.25 MG/1
TABLET ORAL
COMMUNITY
Start: 2024-12-02

## 2025-02-14 ASSESSMENT — ENCOUNTER SYMPTOMS
GASTROINTESTINAL NEGATIVE: 1
COUGH: 1

## 2025-02-14 NOTE — TELEPHONE ENCOUNTER
Pt's wife called to inform Dr that Pt was seen on 2/05/25 and diagnosed with Covid by Dr. Hines at [facility]. Pt tood 5 days of Paxlovid and woke up this morning with:    Fever-101.00  Headache  Tired  Congestion    Pt is looking for advice.

## 2025-02-14 NOTE — PROGRESS NOTES
Sagar Lopez (:  1955) is a 69 y.o. male,New patient, here for evaluation of the following chief complaint(s):  Fever and Congestion (Pt states he was dx with COVID on , sx began , treated with paxlovid. Today c/o fever of 101.1, dry cough, runny nose, fatigue, headache ongoing since early today. No otc meds taken.)      Assessment & Plan :  Visit Diagnoses and Associated Orders       Flu-like symptoms    -  Primary    POCT Influenza A/B Antigen [44036 Custom]      POCT COVID-19, Antigen [SHX809 Custom]           Nasal congestion             ORDERS WITHOUT AN ASSOCIATED DIAGNOSIS    ALPRAZolam (XANAX) 0.25 MG tablet [324]               Follow up in 3-4 days if symptoms persist or if symptoms worsen.       Subjective :       69 y.o. male presents with symptoms of patient presents with flulike symptoms for the last 3 days.  He also had COVID on .  Which she was given Paxlovid.  He got better however he presents today with residual symptoms.  He only been taking Tylenol over-the-counter to help with symptoms.  His symptoms are flulike symptoms, fatigue and nasal congestion.  Denies any chest pain or shortness of breath.  Or difficulty breathing.         Vitals:    25 1740   BP: 115/78   Site: Left Upper Arm   Position: Sitting   Cuff Size: Large Adult   Pulse: 84   Resp: 14   Temp: 99.1 °F (37.3 °C)   TempSrc: Oral   SpO2: 97%   Weight: 105.7 kg (233 lb)   Height: 1.88 m (6' 2\")       No results found for this visit on 25.   Review of Systems   Constitutional:  Positive for chills, fatigue and fever.   HENT:  Positive for congestion.    Respiratory:  Positive for cough.    Cardiovascular: Negative.    Gastrointestinal: Negative.    Musculoskeletal:  Positive for myalgias.         Objective   Physical Exam  Constitutional:       Appearance: Normal appearance.   HENT:      Head: Normocephalic and atraumatic.      Nose: Congestion present.      Mouth/Throat:      Mouth:

## 2025-02-25 ENCOUNTER — HOSPITAL ENCOUNTER (OUTPATIENT)
Facility: HOSPITAL | Age: 70
Discharge: HOME | End: 2025-02-28
Payer: MEDICARE

## 2025-02-25 DIAGNOSIS — I71.20 THORACIC AORTIC ANEURYSM (TAA), UNSPECIFIED PART, UNSPECIFIED WHETHER RUPTURED: ICD-10-CM

## 2025-02-25 DIAGNOSIS — I35.1 NONRHEUMATIC AORTIC VALVE INSUFFICIENCY: ICD-10-CM

## 2025-02-25 PROCEDURE — 75557 CARDIAC MRI FOR MORPH: CPT

## 2025-02-25 PROCEDURE — 75557 CARDIAC MRI FOR MORPH: CPT | Performed by: INTERNAL MEDICINE

## 2025-04-26 SDOH — HEALTH STABILITY: PHYSICAL HEALTH: ON AVERAGE, HOW MANY MINUTES DO YOU ENGAGE IN EXERCISE AT THIS LEVEL?: 30 MIN

## 2025-04-26 SDOH — HEALTH STABILITY: PHYSICAL HEALTH: ON AVERAGE, HOW MANY DAYS PER WEEK DO YOU ENGAGE IN MODERATE TO STRENUOUS EXERCISE (LIKE A BRISK WALK)?: 5 DAYS

## 2025-04-26 ASSESSMENT — LIFESTYLE VARIABLES
HOW OFTEN DO YOU HAVE A DRINK CONTAINING ALCOHOL: 3
HOW OFTEN DO YOU HAVE A DRINK CONTAINING ALCOHOL: 2-4 TIMES A MONTH
HOW MANY STANDARD DRINKS CONTAINING ALCOHOL DO YOU HAVE ON A TYPICAL DAY: 1
HOW OFTEN DO YOU HAVE SIX OR MORE DRINKS ON ONE OCCASION: 1
HOW MANY STANDARD DRINKS CONTAINING ALCOHOL DO YOU HAVE ON A TYPICAL DAY: 1 OR 2

## 2025-04-26 ASSESSMENT — PATIENT HEALTH QUESTIONNAIRE - PHQ9
2. FEELING DOWN, DEPRESSED OR HOPELESS: NOT AT ALL
1. LITTLE INTEREST OR PLEASURE IN DOING THINGS: NOT AT ALL
SUM OF ALL RESPONSES TO PHQ QUESTIONS 1-9: 0

## 2025-05-04 NOTE — PROGRESS NOTES
Medicare Annual Wellness Visit    Sagar Lopez is here for Medicare AWV (Patient here for AWV with no concerns) and Lab Collection (Fasting)    Assessment & Plan   Medicare annual wellness visit, subsequent  Screen for colon cancer  -     AFL - Ryan Hernanedz MD, Gastroenterology, [place] ([facility])  Need for pneumococcal 20-valent conjugate vaccination  Pure hypertriglyceridemia  -     Lipid Panel; Future  Screening PSA (prostate specific antigen)  -     PSA Screening; Future  Allergy to alpha-gal  Encounter for long-term (current) use of medications  -     Comprehensive Metabolic Panel; Future  -     CBC with Auto Differential; Future  Benign prostatic hyperplasia with lower urinary tract symptoms, symptom details unspecified  Cardiomyopathy, unspecified type (HCC)  Comments:  MRI cardiac done 2/2025        No follow-ups on file.     Subjective   The following acute and/or chronic problems were also addressed today:  Hyperlipidemia, alpha gal allergy, BPH.  Cardiology ordered stress test and cardiac MRI. This showed mild hypokinesis and mild decreased EF.    Patient's complete Health Risk Assessment and screening values have been reviewed and are found in Flowsheets. The following problems were reviewed today and where indicated follow up appointments were made and/or referrals ordered.    Positive Risk Factor Screenings with Interventions:                    Safety:  Do you have non-slip mats or non-slip surfaces or shower bars or grab bars in your shower or bathtub?: (!) (Patient-Rptd) No  Interventions:  See AVS for additional education material               Objective   Vitals:    05/05/25 0834   BP: 122/72   BP Site: Left Upper Arm   Patient Position: Sitting   BP Cuff Size: Large Adult   Pulse: 62   Resp: 16   Temp: 98.7 °F (37.1 °C)   TempSrc: Temporal   SpO2: 95%   Weight: 102.5 kg (226 lb)   Height: 1.88 m (6' 2\")      Body mass index is 29.02 kg/m².        General Appearance: alert and

## 2025-05-05 ENCOUNTER — OFFICE VISIT (OUTPATIENT)
Age: 70
End: 2025-05-05
Payer: MEDICARE

## 2025-05-05 ENCOUNTER — LAB (OUTPATIENT)
Age: 70
End: 2025-05-05
Payer: MEDICARE

## 2025-05-05 VITALS
SYSTOLIC BLOOD PRESSURE: 122 MMHG | HEART RATE: 62 BPM | TEMPERATURE: 98.7 F | WEIGHT: 226 LBS | DIASTOLIC BLOOD PRESSURE: 72 MMHG | OXYGEN SATURATION: 95 % | HEIGHT: 74 IN | RESPIRATION RATE: 16 BRPM | BODY MASS INDEX: 29 KG/M2

## 2025-05-05 DIAGNOSIS — E78.1 PURE HYPERTRIGLYCERIDEMIA: ICD-10-CM

## 2025-05-05 DIAGNOSIS — N40.1 BENIGN PROSTATIC HYPERPLASIA WITH LOWER URINARY TRACT SYMPTOMS, SYMPTOM DETAILS UNSPECIFIED: ICD-10-CM

## 2025-05-05 DIAGNOSIS — Z12.5 SCREENING PSA (PROSTATE SPECIFIC ANTIGEN): ICD-10-CM

## 2025-05-05 DIAGNOSIS — I42.9 CARDIOMYOPATHY, UNSPECIFIED TYPE (HCC): ICD-10-CM

## 2025-05-05 DIAGNOSIS — Z00.00 MEDICARE ANNUAL WELLNESS VISIT, SUBSEQUENT: Primary | ICD-10-CM

## 2025-05-05 DIAGNOSIS — Z12.11 SCREEN FOR COLON CANCER: ICD-10-CM

## 2025-05-05 DIAGNOSIS — Z79.899 ENCOUNTER FOR LONG-TERM (CURRENT) USE OF MEDICATIONS: ICD-10-CM

## 2025-05-05 DIAGNOSIS — Z91.018 ALLERGY TO ALPHA-GAL: ICD-10-CM

## 2025-05-05 DIAGNOSIS — Z23 NEED FOR PNEUMOCOCCAL 20-VALENT CONJUGATE VACCINATION: ICD-10-CM

## 2025-05-05 PROCEDURE — 99214 OFFICE O/P EST MOD 30 MIN: CPT | Performed by: FAMILY MEDICINE

## 2025-05-05 PROCEDURE — G8419 CALC BMI OUT NRM PARAM NOF/U: HCPCS | Performed by: FAMILY MEDICINE

## 2025-05-05 PROCEDURE — 1126F AMNT PAIN NOTED NONE PRSNT: CPT | Performed by: FAMILY MEDICINE

## 2025-05-05 PROCEDURE — 3017F COLORECTAL CA SCREEN DOC REV: CPT | Performed by: FAMILY MEDICINE

## 2025-05-05 PROCEDURE — 1036F TOBACCO NON-USER: CPT | Performed by: FAMILY MEDICINE

## 2025-05-05 PROCEDURE — G2211 COMPLEX E/M VISIT ADD ON: HCPCS | Performed by: FAMILY MEDICINE

## 2025-05-05 PROCEDURE — 1160F RVW MEDS BY RX/DR IN RCRD: CPT | Performed by: FAMILY MEDICINE

## 2025-05-05 PROCEDURE — 1159F MED LIST DOCD IN RCRD: CPT | Performed by: FAMILY MEDICINE

## 2025-05-05 PROCEDURE — G8427 DOCREV CUR MEDS BY ELIG CLIN: HCPCS | Performed by: FAMILY MEDICINE

## 2025-05-05 PROCEDURE — 1123F ACP DISCUSS/DSCN MKR DOCD: CPT | Performed by: FAMILY MEDICINE

## 2025-05-05 PROCEDURE — G0439 PPPS, SUBSEQ VISIT: HCPCS | Performed by: FAMILY MEDICINE

## 2025-05-05 SDOH — ECONOMIC STABILITY: FOOD INSECURITY: WITHIN THE PAST 12 MONTHS, YOU WORRIED THAT YOUR FOOD WOULD RUN OUT BEFORE YOU GOT MONEY TO BUY MORE.: NEVER TRUE

## 2025-05-05 SDOH — ECONOMIC STABILITY: FOOD INSECURITY: WITHIN THE PAST 12 MONTHS, THE FOOD YOU BOUGHT JUST DIDN'T LAST AND YOU DIDN'T HAVE MONEY TO GET MORE.: NEVER TRUE

## 2025-05-05 NOTE — PROGRESS NOTES
Identified pt with two pt identifiers(name and )    Chief Complaint   Patient presents with    Medicare AWV     Patient here for AWV with no concerns    Lab Collection     Fasting        Health Maintenance Due   Topic    DTaP/Tdap/Td vaccine (1 - Tdap)    Shingles vaccine (1 of 2)    Pneumococcal 50+ years Vaccine (1 of 1 - PCV)    COVID-19 Vaccine ( season)       Wt Readings from Last 3 Encounters:   25 102.5 kg (226 lb)   25 105.7 kg (233 lb)   24 105.2 kg (232 lb)     Temp Readings from Last 3 Encounters:   25 98.7 °F (37.1 °C) (Temporal)   25 99.1 °F (37.3 °C) (Oral)   24 98.8 °F (37.1 °C) (Temporal)     BP Readings from Last 3 Encounters:   25 122/72   25 115/78   24 138/84     Pulse Readings from Last 3 Encounters:   25 62   25 84   24 72           Depression Screening:  :         2025     7:47 AM 2024    10:55 AM 2023     1:00 PM 3/29/2023     1:08 PM 3/23/2023     9:06 AM   PHQ-9 Questionaire   Little interest or pleasure in doing things 0 0 0 0 0   Feeling down, depressed, or hopeless 0 0 0 0 0   PHQ-9 Total Score 0  0 0 0 0       Patient-reported        Fall Risk Assessment:  :         2025     7:47 AM 2024    10:55 AM   Fall Risk   Do you feel unsteady or are you worried about falling?  no no   2 or more falls in past year? no no   Fall with injury in past year? no no        Abuse Screening:  :          No data to display                 Coordination of Care Questionnaire:  :     \"Have you been to the ER, urgent care clinic since your last visit?  Hospitalized since your last visit?\"    NO    “Have you seen or consulted any other health care providers outside our system since your last visit?”    NO      Click Here for Release of Records Request

## 2025-05-06 ENCOUNTER — RESULTS FOLLOW-UP (OUTPATIENT)
Age: 70
End: 2025-05-06

## 2025-05-06 LAB
ALBUMIN SERPL-MCNC: 4 G/DL (ref 3.5–5)
ALBUMIN/GLOB SERPL: 1.4 (ref 1.1–2.2)
ALP SERPL-CCNC: 95 U/L (ref 45–117)
ALT SERPL-CCNC: 21 U/L (ref 12–78)
ANION GAP SERPL CALC-SCNC: 5 MMOL/L (ref 2–12)
AST SERPL-CCNC: 25 U/L (ref 15–37)
BASOPHILS # BLD: 0.03 K/UL (ref 0–0.1)
BASOPHILS NFR BLD: 0.6 % (ref 0–1)
BILIRUB SERPL-MCNC: 0.7 MG/DL (ref 0.2–1)
BUN SERPL-MCNC: 11 MG/DL (ref 6–20)
BUN/CREAT SERPL: 11 (ref 12–20)
CALCIUM SERPL-MCNC: 9.3 MG/DL (ref 8.5–10.1)
CHLORIDE SERPL-SCNC: 104 MMOL/L (ref 97–108)
CHOLEST SERPL-MCNC: 144 MG/DL
CO2 SERPL-SCNC: 29 MMOL/L (ref 21–32)
CREAT SERPL-MCNC: 0.97 MG/DL (ref 0.7–1.3)
DIFFERENTIAL METHOD BLD: ABNORMAL
EOSINOPHIL # BLD: 0.14 K/UL (ref 0–0.4)
EOSINOPHIL NFR BLD: 3 % (ref 0–7)
ERYTHROCYTE [DISTWIDTH] IN BLOOD BY AUTOMATED COUNT: 13.1 % (ref 11.5–14.5)
GLOBULIN SER CALC-MCNC: 2.9 G/DL (ref 2–4)
GLUCOSE SERPL-MCNC: 97 MG/DL (ref 65–100)
HCT VFR BLD AUTO: 42.7 % (ref 36.6–50.3)
HDLC SERPL-MCNC: 44 MG/DL
HDLC SERPL: 3.3 (ref 0–5)
HGB BLD-MCNC: 14.4 G/DL (ref 12.1–17)
IMM GRANULOCYTES # BLD AUTO: 0.02 K/UL (ref 0–0.04)
IMM GRANULOCYTES NFR BLD AUTO: 0.4 % (ref 0–0.5)
LDLC SERPL CALC-MCNC: 83 MG/DL (ref 0–100)
LYMPHOCYTES # BLD: 0.95 K/UL (ref 0.8–3.5)
LYMPHOCYTES NFR BLD: 20.3 % (ref 12–49)
MCH RBC QN AUTO: 30.7 PG (ref 26–34)
MCHC RBC AUTO-ENTMCNC: 33.7 G/DL (ref 30–36.5)
MCV RBC AUTO: 91 FL (ref 80–99)
MONOCYTES # BLD: 0.69 K/UL (ref 0–1)
MONOCYTES NFR BLD: 14.7 % (ref 5–13)
NEUTS SEG # BLD: 2.86 K/UL (ref 1.8–8)
NEUTS SEG NFR BLD: 61 % (ref 32–75)
NRBC # BLD: 0 K/UL (ref 0–0.01)
NRBC BLD-RTO: 0 PER 100 WBC
PLATELET # BLD AUTO: 122 K/UL (ref 150–400)
PMV BLD AUTO: 12.8 FL (ref 8.9–12.9)
POTASSIUM SERPL-SCNC: 4.4 MMOL/L (ref 3.5–5.1)
PROT SERPL-MCNC: 6.9 G/DL (ref 6.4–8.2)
PSA SERPL-MCNC: 4.9 NG/ML (ref 0.01–4)
RBC # BLD AUTO: 4.69 M/UL (ref 4.1–5.7)
SODIUM SERPL-SCNC: 138 MMOL/L (ref 136–145)
TRIGL SERPL-MCNC: 85 MG/DL
TSH SERPL DL<=0.05 MIU/L-ACNC: 1.88 UIU/ML (ref 0.36–3.74)
VLDLC SERPL CALC-MCNC: 17 MG/DL
WBC # BLD AUTO: 4.7 K/UL (ref 4.1–11.1)

## 2025-06-18 LAB — PROSTATE SPECIFIC ANTIGEN: 3.38 NG/ML

## (undated) DEVICE — KENDALL RADIOLUCENT FOAM MONITORING ELECTRODE -RECTANGULAR SHAPE: Brand: KENDALL

## (undated) DEVICE — 1200 GUARD II KIT W/5MM TUBE W/O VAC TUBE: Brand: GUARDIAN

## (undated) DEVICE — TRAP SURG QUAD PARABOLA SLOT DSGN SFTY SCRN TRAPEASE

## (undated) DEVICE — BW-412T DISP COMBO CLEANING BRUSH: Brand: SINGLE USE COMBINATION CLEANING BRUSH

## (undated) DEVICE — BAG BELONG PT PERS CLEAR HANDL

## (undated) DEVICE — RETRIEVER ENDOSCP L230CM DIA2.5MM NET W3XL5.5CM MIN WRK CHN

## (undated) DEVICE — REM POLYHESIVE ADULT PATIENT RETURN ELECTRODE: Brand: VALLEYLAB

## (undated) DEVICE — Device: Brand: MEDICAL ACTION INDUSTRIES

## (undated) DEVICE — SYRINGE MED 20ML STD CLR PLAS LUERLOCK TIP N CTRL DISP

## (undated) DEVICE — SOLIDIFIER FLUID 3000 CC ABSORB

## (undated) DEVICE — AIRLIFE™ U/CONNECT-IT OXYGEN TUBING 7 FEET (2.1 M) CRUSH-RESISTANT OXYGEN TUBING, VINYL TIPPED: Brand: AIRLIFE™

## (undated) DEVICE — CONNECTOR TBNG AUX H2O JET DISP FOR OLY 160/180 SER

## (undated) DEVICE — Z CONVERTED USE 2274299 CUFF BLD PRESSURE LNG MED AD 25-35 CM ARM FLEXIPORT DISP

## (undated) DEVICE — NEONATAL-ADULT SPO2 SENSOR: Brand: NELLCOR

## (undated) DEVICE — SET ADMIN 16ML TBNG L100IN 2 Y INJ SITE IV PIGGY BK DISP

## (undated) DEVICE — SNARE ENDOSCP M L240CM W27MM SHTH DIA2.4MM CHN 2.8MM OVL

## (undated) DEVICE — SNARE POLYP SM AD W13MMXL240CM SHTH DIA2.4MM HEX STIFF

## (undated) DEVICE — Z DISCONTINUED NO SUB IDED SET EXTN W/ 4 W STPCOCK M SPIN LOK 36IN

## (undated) DEVICE — WORKING LENGTH 235CM, WORKING CHANNEL 2.8MM: Brand: RESOLUTION 360 CLIP

## (undated) DEVICE — CATH IV AUTOGRD BC BLU 22GA 25 -- INSYTE

## (undated) DEVICE — UNDERPAD XTR STRGHT 30X36IN --

## (undated) DEVICE — ENDO CARRY-ON PROCEDURE KIT INCLUDES ENZYMATIC SPONGE, GAUZE, BIOHAZARD LABEL, TRAY, LUBRICANT, DIRTY SCOPE LABEL, WATER LABEL, TRAY, DRAWSTRING PAD, AND DEFENDO 4-PIECE KIT.: Brand: ENDO CARRY-ON PROCEDURE KIT

## (undated) DEVICE — Z DISCONTINUED USE 2751540 TUBING IRRIG L10IN DISP PMP ENDOGATOR

## (undated) DEVICE — QUILTED PREMIUM COMFORT UNDERPAD,EXTRA HEAVY: Brand: WINGS

## (undated) DEVICE — CONTAINER SPEC 20 ML LID NEUT BUFF FORMALIN 10 % POLYPR STS

## (undated) DEVICE — BAG SPEC BIOHZD LF 2MIL 6X10IN -- CONVERT TO ITEM 357326

## (undated) DEVICE — NEEDLE HYPO 18GA L1.5IN PNK S STL HUB POLYPR SHLD REG BVL